# Patient Record
Sex: FEMALE | Race: BLACK OR AFRICAN AMERICAN | NOT HISPANIC OR LATINO
[De-identification: names, ages, dates, MRNs, and addresses within clinical notes are randomized per-mention and may not be internally consistent; named-entity substitution may affect disease eponyms.]

---

## 2019-10-25 ENCOUNTER — APPOINTMENT (OUTPATIENT)
Dept: ANTEPARTUM | Facility: CLINIC | Age: 29
End: 2019-10-25
Payer: COMMERCIAL

## 2019-10-25 PROCEDURE — 76811 OB US DETAILED SNGL FETUS: CPT

## 2019-10-25 PROCEDURE — 76817 TRANSVAGINAL US OBSTETRIC: CPT | Mod: 59

## 2019-11-26 ENCOUNTER — APPOINTMENT (OUTPATIENT)
Dept: OBGYN | Facility: CLINIC | Age: 29
End: 2019-11-26

## 2019-12-30 ENCOUNTER — APPOINTMENT (OUTPATIENT)
Dept: OBGYN | Facility: CLINIC | Age: 29
End: 2019-12-30
Payer: COMMERCIAL

## 2019-12-30 PROCEDURE — 0502F SUBSEQUENT PRENATAL CARE: CPT

## 2020-01-18 ENCOUNTER — APPOINTMENT (OUTPATIENT)
Dept: ANTEPARTUM | Facility: CLINIC | Age: 30
End: 2020-01-18
Payer: COMMERCIAL

## 2020-01-18 PROCEDURE — 76816 OB US FOLLOW-UP PER FETUS: CPT

## 2020-01-18 PROCEDURE — 76819 FETAL BIOPHYS PROFIL W/O NST: CPT

## 2020-02-03 ENCOUNTER — APPOINTMENT (OUTPATIENT)
Dept: OBGYN | Facility: CLINIC | Age: 30
End: 2020-02-03

## 2020-02-21 ENCOUNTER — APPOINTMENT (OUTPATIENT)
Dept: OBGYN | Facility: CLINIC | Age: 30
End: 2020-02-21

## 2020-02-28 ENCOUNTER — APPOINTMENT (OUTPATIENT)
Dept: ANTEPARTUM | Facility: CLINIC | Age: 30
End: 2020-02-28
Payer: COMMERCIAL

## 2020-02-28 PROCEDURE — 76816 OB US FOLLOW-UP PER FETUS: CPT

## 2020-02-28 PROCEDURE — 76819 FETAL BIOPHYS PROFIL W/O NST: CPT

## 2020-03-07 ENCOUNTER — APPOINTMENT (OUTPATIENT)
Dept: OBGYN | Facility: CLINIC | Age: 30
End: 2020-03-07
Payer: COMMERCIAL

## 2020-03-07 PROCEDURE — 0502F SUBSEQUENT PRENATAL CARE: CPT

## 2020-03-09 ENCOUNTER — APPOINTMENT (OUTPATIENT)
Dept: OBGYN | Facility: CLINIC | Age: 30
End: 2020-03-09

## 2020-03-12 ENCOUNTER — APPOINTMENT (OUTPATIENT)
Dept: ANTEPARTUM | Facility: CLINIC | Age: 30
End: 2020-03-12

## 2020-03-12 ENCOUNTER — INPATIENT (INPATIENT)
Facility: HOSPITAL | Age: 30
LOS: 3 days | Discharge: HOME | End: 2020-03-16
Attending: OBSTETRICS & GYNECOLOGY | Admitting: OBSTETRICS & GYNECOLOGY
Payer: COMMERCIAL

## 2020-03-12 VITALS — DIASTOLIC BLOOD PRESSURE: 71 MMHG | HEART RATE: 81 BPM | SYSTOLIC BLOOD PRESSURE: 154 MMHG

## 2020-03-12 DIAGNOSIS — O13.3 GESTATIONAL [PREGNANCY-INDUCED] HYPERTENSION WITHOUT SIGNIFICANT PROTEINURIA, THIRD TRIMESTER: ICD-10-CM

## 2020-03-12 LAB
ALBUMIN SERPL ELPH-MCNC: 3.3 G/DL — SIGNIFICANT CHANGE UP (ref 3.3–5)
ALP SERPL-CCNC: 407 U/L — HIGH (ref 40–120)
ALT FLD-CCNC: 23 U/L — SIGNIFICANT CHANGE UP (ref 4–33)
ANION GAP SERPL CALC-SCNC: 9 MMO/L — SIGNIFICANT CHANGE UP (ref 7–14)
APPEARANCE UR: CLEAR — SIGNIFICANT CHANGE UP
APTT BLD: 25.5 SEC — LOW (ref 27.5–36.3)
AST SERPL-CCNC: 28 U/L — SIGNIFICANT CHANGE UP (ref 4–32)
BASOPHILS # BLD AUTO: 0.02 K/UL — SIGNIFICANT CHANGE UP (ref 0–0.2)
BASOPHILS NFR BLD AUTO: 0.3 % — SIGNIFICANT CHANGE UP (ref 0–2)
BILIRUB SERPL-MCNC: 0.2 MG/DL — SIGNIFICANT CHANGE UP (ref 0.2–1.2)
BILIRUB UR-MCNC: NEGATIVE — SIGNIFICANT CHANGE UP
BLD GP AB SCN SERPL QL: NEGATIVE — SIGNIFICANT CHANGE UP
BLOOD UR QL VISUAL: NEGATIVE — SIGNIFICANT CHANGE UP
BUN SERPL-MCNC: 6 MG/DL — LOW (ref 7–23)
CALCIUM SERPL-MCNC: 10.1 MG/DL — SIGNIFICANT CHANGE UP (ref 8.4–10.5)
CHLORIDE SERPL-SCNC: 104 MMOL/L — SIGNIFICANT CHANGE UP (ref 98–107)
CO2 SERPL-SCNC: 24 MMOL/L — SIGNIFICANT CHANGE UP (ref 22–31)
COLOR SPEC: SIGNIFICANT CHANGE UP
CREAT SERPL-MCNC: 0.54 MG/DL — SIGNIFICANT CHANGE UP (ref 0.5–1.3)
EOSINOPHIL # BLD AUTO: 0.11 K/UL — SIGNIFICANT CHANGE UP (ref 0–0.5)
EOSINOPHIL NFR BLD AUTO: 1.5 % — SIGNIFICANT CHANGE UP (ref 0–6)
FIBRINOGEN PPP-MCNC: 494 MG/DL — SIGNIFICANT CHANGE UP (ref 300–520)
GLUCOSE SERPL-MCNC: 71 MG/DL — SIGNIFICANT CHANGE UP (ref 70–99)
GLUCOSE UR-MCNC: NEGATIVE — SIGNIFICANT CHANGE UP
HCT VFR BLD CALC: 40.2 % — SIGNIFICANT CHANGE UP (ref 34.5–45)
HGB BLD-MCNC: 12.8 G/DL — SIGNIFICANT CHANGE UP (ref 11.5–15.5)
IMM GRANULOCYTES NFR BLD AUTO: 0.3 % — SIGNIFICANT CHANGE UP (ref 0–1.5)
INR BLD: 0.96 — SIGNIFICANT CHANGE UP (ref 0.88–1.17)
KETONES UR-MCNC: NEGATIVE — SIGNIFICANT CHANGE UP
LDH SERPL L TO P-CCNC: 215 U/L — SIGNIFICANT CHANGE UP (ref 135–225)
LEUKOCYTE ESTERASE UR-ACNC: NEGATIVE — SIGNIFICANT CHANGE UP
LYMPHOCYTES # BLD AUTO: 1.71 K/UL — SIGNIFICANT CHANGE UP (ref 1–3.3)
LYMPHOCYTES # BLD AUTO: 23.4 % — SIGNIFICANT CHANGE UP (ref 13–44)
MCHC RBC-ENTMCNC: 29.5 PG — SIGNIFICANT CHANGE UP (ref 27–34)
MCHC RBC-ENTMCNC: 31.8 % — LOW (ref 32–36)
MCV RBC AUTO: 92.6 FL — SIGNIFICANT CHANGE UP (ref 80–100)
MONOCYTES # BLD AUTO: 0.87 K/UL — SIGNIFICANT CHANGE UP (ref 0–0.9)
MONOCYTES NFR BLD AUTO: 11.9 % — SIGNIFICANT CHANGE UP (ref 2–14)
NEUTROPHILS # BLD AUTO: 4.58 K/UL — SIGNIFICANT CHANGE UP (ref 1.8–7.4)
NEUTROPHILS NFR BLD AUTO: 62.6 % — SIGNIFICANT CHANGE UP (ref 43–77)
NITRITE UR-MCNC: NEGATIVE — SIGNIFICANT CHANGE UP
NRBC # FLD: 0 K/UL — SIGNIFICANT CHANGE UP (ref 0–0)
PH UR: 7.5 — SIGNIFICANT CHANGE UP (ref 5–8)
PLATELET # BLD AUTO: 174 K/UL — SIGNIFICANT CHANGE UP (ref 150–400)
PMV BLD: 10.8 FL — SIGNIFICANT CHANGE UP (ref 7–13)
POTASSIUM SERPL-MCNC: 4 MMOL/L — SIGNIFICANT CHANGE UP (ref 3.5–5.3)
POTASSIUM SERPL-SCNC: 4 MMOL/L — SIGNIFICANT CHANGE UP (ref 3.5–5.3)
PROT SERPL-MCNC: 6.3 G/DL — SIGNIFICANT CHANGE UP (ref 6–8.3)
PROT UR-MCNC: NEGATIVE — SIGNIFICANT CHANGE UP
PROTHROM AB SERPL-ACNC: 10.9 SEC — SIGNIFICANT CHANGE UP (ref 9.8–13.1)
RBC # BLD: 4.34 M/UL — SIGNIFICANT CHANGE UP (ref 3.8–5.2)
RBC # FLD: 13.8 % — SIGNIFICANT CHANGE UP (ref 10.3–14.5)
RH IG SCN BLD-IMP: POSITIVE — SIGNIFICANT CHANGE UP
RH IG SCN BLD-IMP: POSITIVE — SIGNIFICANT CHANGE UP
SODIUM SERPL-SCNC: 137 MMOL/L — SIGNIFICANT CHANGE UP (ref 135–145)
SP GR SPEC: 1.01 — SIGNIFICANT CHANGE UP (ref 1–1.04)
URATE SERPL-MCNC: 4.7 MG/DL — SIGNIFICANT CHANGE UP (ref 2.5–7)
UROBILINOGEN FLD QL: NORMAL — SIGNIFICANT CHANGE UP
WBC # BLD: 7.31 K/UL — SIGNIFICANT CHANGE UP (ref 3.8–10.5)
WBC # FLD AUTO: 7.31 K/UL — SIGNIFICANT CHANGE UP (ref 3.8–10.5)

## 2020-03-12 RX ORDER — AMPICILLIN TRIHYDRATE 250 MG
1 CAPSULE ORAL EVERY 4 HOURS
Refills: 0 | Status: DISCONTINUED | OUTPATIENT
Start: 2020-03-12 | End: 2020-03-14

## 2020-03-12 RX ORDER — AMPICILLIN TRIHYDRATE 250 MG
2 CAPSULE ORAL ONCE
Refills: 0 | Status: COMPLETED | OUTPATIENT
Start: 2020-03-12 | End: 2020-03-12

## 2020-03-12 RX ORDER — SODIUM CHLORIDE 9 MG/ML
1000 INJECTION, SOLUTION INTRAVENOUS
Refills: 0 | Status: DISCONTINUED | OUTPATIENT
Start: 2020-03-12 | End: 2020-03-14

## 2020-03-12 RX ORDER — OXYTOCIN 10 UNIT/ML
333.33 VIAL (ML) INJECTION
Qty: 20 | Refills: 0 | Status: DISCONTINUED | OUTPATIENT
Start: 2020-03-12 | End: 2020-03-14

## 2020-03-12 RX ORDER — CITRIC ACID/SODIUM CITRATE 300-500 MG
15 SOLUTION, ORAL ORAL EVERY 6 HOURS
Refills: 0 | Status: DISCONTINUED | OUTPATIENT
Start: 2020-03-12 | End: 2020-03-14

## 2020-03-12 RX ADMIN — Medication 216 GRAM(S): at 20:45

## 2020-03-12 NOTE — OB PROVIDER H&P - PROBLEM SELECTOR PLAN 1
- Admit to L+D  - routine labs, hellp labs  - amp for gbs ppx   - efm/toco  - anticipate   - consents singed  dw Dr. Fabiano Duffy PGY1

## 2020-03-12 NOTE — MEDICAL STUDENT ADULT H&P (EDUCATION) - NS MD HP STUD ASPLAN PLAN FT
Problem 1: induction for pre-eclampsia.   Plan: start IV cytotek     Problem 2: GBS+  Plan: start IV AMP    Problem 3: pain control - wrist, hip  Plan: monitor pain. Consider tylenol/NSAIDs for moderate pain. Consider epidural for severe pain.     Problem 4: hand/finger-cramping. Likely carpal tunnel secondary to pregnancy:  Plan: monitor if condition worsens. Consider BMP to check electrolytes.

## 2020-03-12 NOTE — MEDICAL STUDENT ADULT H&P (EDUCATION) - NS MD HP STUD HX OF PRESENT ILLNESS FT
Pt is a 41 yo  at 40w MAR 3/11/20 LMP 2019 who presents to [facility] per Dr. Guzmán's recommendation to come in for induction due to pre-eclampsia. Pt states that her BP has been elevated for the past week, and reached 142/85 on 3/9 (Monday). Pt denies any history of HTN prior to pregnancy. Pt also reports RUQ pain under her right breast which began a few months ago. This abdominal pain is worsened with wearing a bra, and relieved when lying on her back. The patient also notes that she has been leg/hip discomfort bilaterally, described as a "cramp"/"burning"/"tender" sensation, that has been going on for the 4 months. The patient has been taking tylenol PRN for pain control. The patient also notes that she is having a cramping sensation in her fingers. Pt reports that her pain is currently controlled. Pt denies f/c/n/v/cp/p/SOB. Pt denies recent hx of international travel. Pt is a 39 yo  at 40w MAR 3/11/20 LMP 2019 who presents to [facility] per her Ob (Dr. Guzmán) recommendation to come in for induction due to pre-eclampsia. Pt states that her BP has been elevated for the past week, and reached 142/85 on 3/9 (Monday). Pt denies any history of HTN prior to pregnancy. Pt also reports RUQ pain under her right breast which began a few months ago. This abdominal pain is worsened with wearing a bra, and relieved when lying on her back. The patient also notes that she has been leg/hip discomfort bilaterally, described as a "cramp"/"burning"/"tender" sensation, that has been going on for the 4 months. The patient has been taking tylenol PRN for pain control. The patient also notes that she is having a cramping sensation in her fingers. Pt reports that her pain is currently controlled. Pt denies f/c/n/v/cp/p/SOB. Pt denies recent hx of international travel.

## 2020-03-12 NOTE — MEDICAL STUDENT ADULT H&P (EDUCATION) - NS MD HP STUD PE VITALS FT
Vital Signs Last 24 Hrs  T(C): 36.8 (12 Mar 2020 18:42), Max: 37.0 (12 Mar 2020 18:37)  T(F): 98.2 (12 Mar 2020 18:42), Max: 98.6 (12 Mar 2020 18:37)  HR: 67 (12 Mar 2020 19:59) (67 - 81)  BP: 135/77 (12 Mar 2020 19:57) (132/72 - 154/71)  BP(mean): --  RR: 16 (12 Mar 2020 18:42) (16 - 16)  SpO2: 94% (12 Mar 2020 20:01) (94% - 100%)

## 2020-03-12 NOTE — MEDICAL STUDENT ADULT H&P (EDUCATION) - NS MD HP STUD PMH FT
Ob Hx: Baby was last measured at 7.5 lbs. Pt is GBS+ and will be placed on IV AMP.   Gyn Hx: denies hx of fibroids, endometriosis, any abnormal pap smear results, or past hx or STI. Denies abnormal bleeding or discharge, dysuria, constipation, or diarrhea.   PMH/PSH: prior hx of asthma when ~9yo (not active); hx intrauterine polyps, surgically removed when pt in teens, pt denies complications.  Allergies: shellfish, passionfruit     Medication: tylenol PRN  Fam Hx: non-contributory. No hx of coagulation disorders.    Social Hx: non-smoker, non-drinker, no illicit substances. Ob Hx: Pt has not been feeling contractions. Baby was last measured at 7.5 lbs. Pt is GBS+ and will be placed on IV AMP.   Gyn Hx: denies hx of fibroids, endometriosis, any abnormal pap smear results, or past hx or STI. Denies abnormal bleeding or discharge, dysuria, constipation, or diarrhea.   PMH/PSH: prior hx of asthma when ~9yo (not active); hx of intrauterine polyps, which were surgically removed when pt in teens, pt denies surgical complications.  Allergies: shellfish, passionfruit     Medication: tylenol PRN  Fam Hx: non-contributory. No hx of coagulation disorders.    Social Hx: non-smoker, non-drinker, no illicit substances.

## 2020-03-12 NOTE — OB PROVIDER H&P - HISTORY OF PRESENT ILLNESS
29yo  at 40+1 presenting for IOl for gHTN. Denies s/sx of severe PEC. Denies ctx, vb, lof. +FM.     GBS +  EFW 3600  Sono Vtx.     ObHx: PO Gyn Hx: denies hx of fibroids, endometriosis, any abnormal pap smear results, or past hx or STI. Denies abnormal bleeding or discharge, dysuria, constipation, or diarrhea.  PMH/PSH: prior hx of asthma when ~9yo (not active); hx of intrauterine polyps, which were surgically removed when pt in teens, pt denies surgical complications. Allergies: shellfish, passion fruit    Medication: tylenol PRN, PNV Fam Hx: non-contributory. No hx of coagulation disorders.   Social Hx: non-smoker, non-drinker, no illicit substances.

## 2020-03-13 LAB
ALBUMIN SERPL ELPH-MCNC: 3.1 G/DL — LOW (ref 3.3–5)
ALP SERPL-CCNC: 428 U/L — HIGH (ref 40–120)
ALT FLD-CCNC: 22 U/L — SIGNIFICANT CHANGE UP (ref 4–33)
ANION GAP SERPL CALC-SCNC: 11 MMO/L — SIGNIFICANT CHANGE UP (ref 7–14)
APPEARANCE UR: CLEAR — SIGNIFICANT CHANGE UP
APTT BLD: 26.8 SEC — LOW (ref 27.5–36.3)
AST SERPL-CCNC: 27 U/L — SIGNIFICANT CHANGE UP (ref 4–32)
BASOPHILS # BLD AUTO: 0.02 K/UL — SIGNIFICANT CHANGE UP (ref 0–0.2)
BASOPHILS NFR BLD AUTO: 0.3 % — SIGNIFICANT CHANGE UP (ref 0–2)
BILIRUB SERPL-MCNC: 0.3 MG/DL — SIGNIFICANT CHANGE UP (ref 0.2–1.2)
BILIRUB UR-MCNC: NEGATIVE — SIGNIFICANT CHANGE UP
BLOOD UR QL VISUAL: NEGATIVE — SIGNIFICANT CHANGE UP
BUN SERPL-MCNC: 4 MG/DL — LOW (ref 7–23)
CALCIUM SERPL-MCNC: 9.7 MG/DL — SIGNIFICANT CHANGE UP (ref 8.4–10.5)
CHLORIDE SERPL-SCNC: 104 MMOL/L — SIGNIFICANT CHANGE UP (ref 98–107)
CO2 SERPL-SCNC: 19 MMOL/L — LOW (ref 22–31)
COLOR SPEC: SIGNIFICANT CHANGE UP
CREAT ?TM UR-MCNC: 40.3 MG/DL — SIGNIFICANT CHANGE UP
CREAT SERPL-MCNC: 0.56 MG/DL — SIGNIFICANT CHANGE UP (ref 0.5–1.3)
EOSINOPHIL # BLD AUTO: 0.08 K/UL — SIGNIFICANT CHANGE UP (ref 0–0.5)
EOSINOPHIL NFR BLD AUTO: 1.1 % — SIGNIFICANT CHANGE UP (ref 0–6)
FIBRINOGEN PPP-MCNC: 473 MG/DL — SIGNIFICANT CHANGE UP (ref 300–520)
GLUCOSE SERPL-MCNC: 66 MG/DL — LOW (ref 70–99)
GLUCOSE UR-MCNC: NEGATIVE — SIGNIFICANT CHANGE UP
HCT VFR BLD CALC: 38 % — SIGNIFICANT CHANGE UP (ref 34.5–45)
HGB BLD-MCNC: 12.5 G/DL — SIGNIFICANT CHANGE UP (ref 11.5–15.5)
IMM GRANULOCYTES NFR BLD AUTO: 0.4 % — SIGNIFICANT CHANGE UP (ref 0–1.5)
INR BLD: 0.92 — SIGNIFICANT CHANGE UP (ref 0.88–1.17)
KETONES UR-MCNC: NEGATIVE — SIGNIFICANT CHANGE UP
LDH SERPL L TO P-CCNC: 185 U/L — SIGNIFICANT CHANGE UP (ref 135–225)
LEUKOCYTE ESTERASE UR-ACNC: NEGATIVE — SIGNIFICANT CHANGE UP
LYMPHOCYTES # BLD AUTO: 1.59 K/UL — SIGNIFICANT CHANGE UP (ref 1–3.3)
LYMPHOCYTES # BLD AUTO: 22.1 % — SIGNIFICANT CHANGE UP (ref 13–44)
MCHC RBC-ENTMCNC: 29.6 PG — SIGNIFICANT CHANGE UP (ref 27–34)
MCHC RBC-ENTMCNC: 32.9 % — SIGNIFICANT CHANGE UP (ref 32–36)
MCV RBC AUTO: 89.8 FL — SIGNIFICANT CHANGE UP (ref 80–100)
MONOCYTES # BLD AUTO: 0.79 K/UL — SIGNIFICANT CHANGE UP (ref 0–0.9)
MONOCYTES NFR BLD AUTO: 11 % — SIGNIFICANT CHANGE UP (ref 2–14)
NEUTROPHILS # BLD AUTO: 4.68 K/UL — SIGNIFICANT CHANGE UP (ref 1.8–7.4)
NEUTROPHILS NFR BLD AUTO: 65.1 % — SIGNIFICANT CHANGE UP (ref 43–77)
NITRITE UR-MCNC: NEGATIVE — SIGNIFICANT CHANGE UP
NRBC # FLD: 0 K/UL — SIGNIFICANT CHANGE UP (ref 0–0)
PH UR: 7.5 — SIGNIFICANT CHANGE UP (ref 5–8)
PLATELET # BLD AUTO: 179 K/UL — SIGNIFICANT CHANGE UP (ref 150–400)
PMV BLD: 10.8 FL — SIGNIFICANT CHANGE UP (ref 7–13)
POTASSIUM SERPL-MCNC: 4 MMOL/L — SIGNIFICANT CHANGE UP (ref 3.5–5.3)
POTASSIUM SERPL-SCNC: 4 MMOL/L — SIGNIFICANT CHANGE UP (ref 3.5–5.3)
PROT SERPL-MCNC: 6.1 G/DL — SIGNIFICANT CHANGE UP (ref 6–8.3)
PROT UR-MCNC: 5.6 MG/DL — SIGNIFICANT CHANGE UP
PROT UR-MCNC: 5.9 MG/DL — SIGNIFICANT CHANGE UP
PROT UR-MCNC: NEGATIVE — SIGNIFICANT CHANGE UP
PROTHROM AB SERPL-ACNC: 10.6 SEC — SIGNIFICANT CHANGE UP (ref 9.8–13.1)
RBC # BLD: 4.23 M/UL — SIGNIFICANT CHANGE UP (ref 3.8–5.2)
RBC # FLD: 13.9 % — SIGNIFICANT CHANGE UP (ref 10.3–14.5)
SODIUM SERPL-SCNC: 134 MMOL/L — LOW (ref 135–145)
SP GR SPEC: 1.01 — SIGNIFICANT CHANGE UP (ref 1–1.04)
URATE SERPL-MCNC: 4.9 MG/DL — SIGNIFICANT CHANGE UP (ref 2.5–7)
UROBILINOGEN FLD QL: NORMAL — SIGNIFICANT CHANGE UP
WBC # BLD: 7.19 K/UL — SIGNIFICANT CHANGE UP (ref 3.8–10.5)
WBC # FLD AUTO: 7.19 K/UL — SIGNIFICANT CHANGE UP (ref 3.8–10.5)

## 2020-03-13 RX ORDER — BUTORPHANOL TARTRATE 2 MG/ML
2 INJECTION, SOLUTION INTRAMUSCULAR; INTRAVENOUS ONCE
Refills: 0 | Status: DISCONTINUED | OUTPATIENT
Start: 2020-03-13 | End: 2020-03-14

## 2020-03-13 RX ORDER — MAGNESIUM SULFATE 500 MG/ML
4 VIAL (ML) INJECTION ONCE
Refills: 0 | Status: COMPLETED | OUTPATIENT
Start: 2020-03-13 | End: 2020-03-13

## 2020-03-13 RX ORDER — MAGNESIUM SULFATE 500 MG/ML
2 VIAL (ML) INJECTION
Qty: 40 | Refills: 0 | Status: DISCONTINUED | OUTPATIENT
Start: 2020-03-13 | End: 2020-03-14

## 2020-03-13 RX ORDER — HYDRALAZINE HCL 50 MG
5 TABLET ORAL ONCE
Refills: 0 | Status: COMPLETED | OUTPATIENT
Start: 2020-03-13 | End: 2020-03-13

## 2020-03-13 RX ADMIN — Medication 200 GRAM(S): at 17:09

## 2020-03-13 RX ADMIN — Medication 50 GM/HR: at 17:34

## 2020-03-13 RX ADMIN — Medication 108 GRAM(S): at 04:49

## 2020-03-13 RX ADMIN — Medication 50 GM/HR: at 19:20

## 2020-03-13 RX ADMIN — Medication 5 MILLIGRAM(S): at 17:01

## 2020-03-13 RX ADMIN — Medication 108 GRAM(S): at 00:48

## 2020-03-13 RX ADMIN — Medication 108 GRAM(S): at 21:03

## 2020-03-13 RX ADMIN — Medication 108 GRAM(S): at 12:40

## 2020-03-13 RX ADMIN — Medication 108 GRAM(S): at 09:11

## 2020-03-13 RX ADMIN — Medication 108 GRAM(S): at 17:36

## 2020-03-13 NOTE — CHART NOTE - NSCHARTNOTEFT_GEN_A_CORE
Pt evaluated at bedside for SVE. SROM and feeling more uncomfortable.    VS  T(C): 36.7 (03-13-20 @ 04:37)  HR: 83 (03-13-20 @ 19:04)  BP: 145/88 (03-13-20 @ 19:04)  RR: --  SpO2: 99% (03-13-20 @ 11:23)    SVE: 1.5/80/-3  FHT: 140 bpm, mod cl, +acel, -decel  [place]: intermittent    Plan  sPEC/Mg  GBS+/amp  FHT Cat 1 reassuring  Declines analgesia at this time    ERIN Bliss PGy1

## 2020-03-13 NOTE — CHART NOTE - NSCHARTNOTEFT_GEN_A_CORE
R1 OB Labor Note    S: Patient seen this AM, IOL 2/2 gHTN. No complaints.     Vital Signs Last 24 Hrs  T(C): 36.7 (13 Mar 2020 04:37), Max: 37.0 (12 Mar 2020 18:37)  T(F): 98 (13 Mar 2020 04:37), Max: 98.6 (12 Mar 2020 18:37)  HR: 86 (13 Mar 2020 10:25) (61 - 98)  BP: 142/79 (13 Mar 2020 09:36) (106/58 - 154/71)  BP(mean): --  RR: 16 (12 Mar 2020 18:42) (16 - 16)  SpO2: 100% (13 Mar 2020 10:22) (88% - 100%)    FHT: 135, mod cl, +accels, no decels  [place]: irregular  SVE: deferred      A/P:   - Labor: IOL 2/2 gHTN. Received 2nd dose of 40 mg PO cytotec at 10am.   - Fetus: Cat I tracing  - GBS: positive, getting ampicillin for ppx  - Pain: well controlled.       ADomney PGY-1  d/w Dr. Mario R1 OB Labor Note    S: Patient seen this AM, IOL 2/2 gHTN. No complaints.     Vital Signs Last 24 Hrs  T(C): 36.7 (13 Mar 2020 04:37), Max: 37.0 (12 Mar 2020 18:37)  T(F): 98 (13 Mar 2020 04:37), Max: 98.6 (12 Mar 2020 18:37)  HR: 86 (13 Mar 2020 10:25) (61 - 98)  BP: 142/79 (13 Mar 2020 09:36) (106/58 - 154/71)  BP(mean): --  RR: 16 (12 Mar 2020 18:42) (16 - 16)  SpO2: 100% (13 Mar 2020 10:22) (88% - 100%)    FHT: 135, mod cl, +accels, no decels  [place]: irregular  SVE: deferred      A/P:   - Labor: IOL 2/2 gHTN. Received 2nd dose of 40 mg PO cytotec at 10am.   - Fetus: Cat I tracing  - GBS: positive, getting ampicillin for ppx  - Pain: well controlled.       ADomney PGY-1  d/w Dr. Mario    Pt seen - tracing reviewed  Agree with above

## 2020-03-13 NOTE — CHART NOTE - NSCHARTNOTEFT_GEN_A_CORE
R1 OB Labor Note    S: Patient seen and examined at bedside for severe range blood pressure x 2. 167/96 -> 169/85 15 minutes apart. Patient with h/o gHTN, asthma, HR 60s - RN instructed to push Hydral 5 IV. Patient asymptomatic. Denies HA, SOB, CP, RUQ/epigastric pain, b/l swelling UE and LE swelling, or vision changes.     Vital Signs Last 24 Hrs  T(C): 36.7 (13 Mar 2020 04:37), Max: 37.0 (12 Mar 2020 18:37)  T(F): 98 (13 Mar 2020 04:37), Max: 98.6 (12 Mar 2020 18:37)  HR: 63 (13 Mar 2020 16:50) (58 - 98)  BP: 163/85 (13 Mar 2020 16:50) (106/58 - 167/83)  BP(mean): --  RR: 16 (12 Mar 2020 18:42) (16 - 16)  SpO2: 99% (13 Mar 2020 11:23) (88% - 100%)    PE:   Gen aaox3, nad  CV RRR, no m/r/g  Lungs CTAB, normal WOB, no rales/rhonci/wheezes  Abd soft, gravid uterus  Ext nonedematous    FHT: 145, mod cl, +accels, no decels  [place]:q2m  SVE: deferred    A/P:   - Hydral 5 IVP  - Magneisum started  - HELLP labs drawn  - f/u BP in 20 minutes (5:22 pm)    ADomney PGY-1  to be d/w Dr. Mario

## 2020-03-13 NOTE — CHART NOTE - NSCHARTNOTEFT_GEN_A_CORE
Delayed entry 2/2 clinical duties  R1 Note 03-14-20 @ 00:38    Pt evaluated for progression  Requesting pain medication    VITALS:  T(C): 36.8 (03-13-20 @ 23:59), Max: 36.8 (03-13-20 @ 21:07)  HR: 79 (03-14-20 @ 00:33) (58 - 96)  BP: 124/60 (03-14-20 @ 00:29) (118/69 - 167/83)  RR: --  SpO2: 94% (03-14-20 @ 00:33) (88% - 100%)    EFM:  mod cl +accels -decels  [place]: Ctx Q2-4min  VE: 2/90/-3    IMPRESSION:   FHR Category: 1  Additional:    PLAN:  Stadol x1  Cont PO    d/w Dr. Jonathan Duffy PGY1

## 2020-03-13 NOTE — CHART NOTE - NSCHARTNOTEFT_GEN_A_CORE
R1 OB Tracing Note    T(C): 36.7 (03-13-20 @ 00:45), Max: 37.0 (03-12-20 @ 18:37)  HR: 69 (03-13-20 @ 02:00) (67 - 98)  BP: 118/69 (03-13-20 @ 01:36) (106/58 - 154/71)  RR: 16 (03-12-20 @ 18:42) (16 - 16)  SpO2: 98% (03-13-20 @ 02:00) (92% - 100%)    EFM: 135 bpm, mod cl, +accels, -decels  [place]: cx irregular on toco    A/P 30y P0 admitted for gHTN IOL  -Cont PO  -Cat 1 tracing    Marty PGY1

## 2020-03-14 ENCOUNTER — TRANSCRIPTION ENCOUNTER (OUTPATIENT)
Age: 30
End: 2020-03-14

## 2020-03-14 LAB
ALBUMIN SERPL ELPH-MCNC: 3.1 G/DL — LOW (ref 3.3–5)
ALBUMIN SERPL ELPH-MCNC: 3.3 G/DL — SIGNIFICANT CHANGE UP (ref 3.3–5)
ALP SERPL-CCNC: 460 U/L — HIGH (ref 40–120)
ALP SERPL-CCNC: 460 U/L — HIGH (ref 40–120)
ALT FLD-CCNC: 22 U/L — SIGNIFICANT CHANGE UP (ref 4–33)
ALT FLD-CCNC: 23 U/L — SIGNIFICANT CHANGE UP (ref 4–33)
ANION GAP SERPL CALC-SCNC: 11 MMO/L — SIGNIFICANT CHANGE UP (ref 7–14)
ANION GAP SERPL CALC-SCNC: 12 MMO/L — SIGNIFICANT CHANGE UP (ref 7–14)
APTT BLD: 25.1 SEC — LOW (ref 27.5–36.3)
APTT BLD: 30.1 SEC — SIGNIFICANT CHANGE UP (ref 27.5–36.3)
AST SERPL-CCNC: 24 U/L — SIGNIFICANT CHANGE UP (ref 4–32)
AST SERPL-CCNC: 25 U/L — SIGNIFICANT CHANGE UP (ref 4–32)
BASOPHILS # BLD AUTO: 0.01 K/UL — SIGNIFICANT CHANGE UP (ref 0–0.2)
BASOPHILS # BLD AUTO: 0.02 K/UL — SIGNIFICANT CHANGE UP (ref 0–0.2)
BASOPHILS NFR BLD AUTO: 0.1 % — SIGNIFICANT CHANGE UP (ref 0–2)
BASOPHILS NFR BLD AUTO: 0.2 % — SIGNIFICANT CHANGE UP (ref 0–2)
BILIRUB SERPL-MCNC: 0.3 MG/DL — SIGNIFICANT CHANGE UP (ref 0.2–1.2)
BILIRUB SERPL-MCNC: 0.3 MG/DL — SIGNIFICANT CHANGE UP (ref 0.2–1.2)
BUN SERPL-MCNC: 4 MG/DL — LOW (ref 7–23)
BUN SERPL-MCNC: 5 MG/DL — LOW (ref 7–23)
CALCIUM SERPL-MCNC: 9 MG/DL — SIGNIFICANT CHANGE UP (ref 8.4–10.5)
CALCIUM SERPL-MCNC: 9.1 MG/DL — SIGNIFICANT CHANGE UP (ref 8.4–10.5)
CHLORIDE SERPL-SCNC: 102 MMOL/L — SIGNIFICANT CHANGE UP (ref 98–107)
CHLORIDE SERPL-SCNC: 103 MMOL/L — SIGNIFICANT CHANGE UP (ref 98–107)
CO2 SERPL-SCNC: 19 MMOL/L — LOW (ref 22–31)
CO2 SERPL-SCNC: 19 MMOL/L — LOW (ref 22–31)
CREAT SERPL-MCNC: 0.51 MG/DL — SIGNIFICANT CHANGE UP (ref 0.5–1.3)
CREAT SERPL-MCNC: 0.56 MG/DL — SIGNIFICANT CHANGE UP (ref 0.5–1.3)
EOSINOPHIL # BLD AUTO: 0.01 K/UL — SIGNIFICANT CHANGE UP (ref 0–0.5)
EOSINOPHIL # BLD AUTO: 0.05 K/UL — SIGNIFICANT CHANGE UP (ref 0–0.5)
EOSINOPHIL NFR BLD AUTO: 0.1 % — SIGNIFICANT CHANGE UP (ref 0–6)
EOSINOPHIL NFR BLD AUTO: 0.5 % — SIGNIFICANT CHANGE UP (ref 0–6)
FIBRINOGEN PPP-MCNC: 442 MG/DL — SIGNIFICANT CHANGE UP (ref 300–520)
FIBRINOGEN PPP-MCNC: 562 MG/DL — HIGH (ref 300–520)
GLUCOSE SERPL-MCNC: 79 MG/DL — SIGNIFICANT CHANGE UP (ref 70–99)
GLUCOSE SERPL-MCNC: 90 MG/DL — SIGNIFICANT CHANGE UP (ref 70–99)
HCT VFR BLD CALC: 38.9 % — SIGNIFICANT CHANGE UP (ref 34.5–45)
HCT VFR BLD CALC: 39.1 % — SIGNIFICANT CHANGE UP (ref 34.5–45)
HGB BLD-MCNC: 12.7 G/DL — SIGNIFICANT CHANGE UP (ref 11.5–15.5)
HGB BLD-MCNC: 12.8 G/DL — SIGNIFICANT CHANGE UP (ref 11.5–15.5)
IMM GRANULOCYTES NFR BLD AUTO: 0.2 % — SIGNIFICANT CHANGE UP (ref 0–1.5)
IMM GRANULOCYTES NFR BLD AUTO: 0.3 % — SIGNIFICANT CHANGE UP (ref 0–1.5)
INR BLD: 0.89 — SIGNIFICANT CHANGE UP (ref 0.88–1.17)
INR BLD: 0.93 — SIGNIFICANT CHANGE UP (ref 0.88–1.17)
LDH SERPL L TO P-CCNC: 194 U/L — SIGNIFICANT CHANGE UP (ref 135–225)
LDH SERPL L TO P-CCNC: 201 U/L — SIGNIFICANT CHANGE UP (ref 135–225)
LYMPHOCYTES # BLD AUTO: 1.05 K/UL — SIGNIFICANT CHANGE UP (ref 1–3.3)
LYMPHOCYTES # BLD AUTO: 1.38 K/UL — SIGNIFICANT CHANGE UP (ref 1–3.3)
LYMPHOCYTES # BLD AUTO: 10 % — LOW (ref 13–44)
LYMPHOCYTES # BLD AUTO: 14.8 % — SIGNIFICANT CHANGE UP (ref 13–44)
MAGNESIUM SERPL-MCNC: 3.9 MG/DL — HIGH (ref 1.6–2.6)
MAGNESIUM SERPL-MCNC: 4.6 MG/DL — HIGH (ref 1.6–2.6)
MAGNESIUM SERPL-MCNC: 5.2 MG/DL — HIGH (ref 1.6–2.6)
MAGNESIUM SERPL-MCNC: 6 MG/DL — HIGH (ref 1.6–2.6)
MCHC RBC-ENTMCNC: 29.3 PG — SIGNIFICANT CHANGE UP (ref 27–34)
MCHC RBC-ENTMCNC: 29.4 PG — SIGNIFICANT CHANGE UP (ref 27–34)
MCHC RBC-ENTMCNC: 32.5 % — SIGNIFICANT CHANGE UP (ref 32–36)
MCHC RBC-ENTMCNC: 32.9 % — SIGNIFICANT CHANGE UP (ref 32–36)
MCV RBC AUTO: 89.2 FL — SIGNIFICANT CHANGE UP (ref 80–100)
MCV RBC AUTO: 90.1 FL — SIGNIFICANT CHANGE UP (ref 80–100)
MONOCYTES # BLD AUTO: 0.8 K/UL — SIGNIFICANT CHANGE UP (ref 0–0.9)
MONOCYTES # BLD AUTO: 0.92 K/UL — HIGH (ref 0–0.9)
MONOCYTES NFR BLD AUTO: 8.6 % — SIGNIFICANT CHANGE UP (ref 2–14)
MONOCYTES NFR BLD AUTO: 8.8 % — SIGNIFICANT CHANGE UP (ref 2–14)
NEUTROPHILS # BLD AUTO: 7.05 K/UL — SIGNIFICANT CHANGE UP (ref 1.8–7.4)
NEUTROPHILS # BLD AUTO: 8.46 K/UL — HIGH (ref 1.8–7.4)
NEUTROPHILS NFR BLD AUTO: 75.7 % — SIGNIFICANT CHANGE UP (ref 43–77)
NEUTROPHILS NFR BLD AUTO: 80.7 % — HIGH (ref 43–77)
NRBC # FLD: 0 K/UL — SIGNIFICANT CHANGE UP (ref 0–0)
NRBC # FLD: 0 K/UL — SIGNIFICANT CHANGE UP (ref 0–0)
PLATELET # BLD AUTO: 167 K/UL — SIGNIFICANT CHANGE UP (ref 150–400)
PLATELET # BLD AUTO: 172 K/UL — SIGNIFICANT CHANGE UP (ref 150–400)
PMV BLD: 10.3 FL — SIGNIFICANT CHANGE UP (ref 7–13)
PMV BLD: 10.3 FL — SIGNIFICANT CHANGE UP (ref 7–13)
POTASSIUM SERPL-MCNC: 3.6 MMOL/L — SIGNIFICANT CHANGE UP (ref 3.5–5.3)
POTASSIUM SERPL-MCNC: 3.9 MMOL/L — SIGNIFICANT CHANGE UP (ref 3.5–5.3)
POTASSIUM SERPL-SCNC: 3.6 MMOL/L — SIGNIFICANT CHANGE UP (ref 3.5–5.3)
POTASSIUM SERPL-SCNC: 3.9 MMOL/L — SIGNIFICANT CHANGE UP (ref 3.5–5.3)
PROT SERPL-MCNC: 6.1 G/DL — SIGNIFICANT CHANGE UP (ref 6–8.3)
PROT SERPL-MCNC: 6.1 G/DL — SIGNIFICANT CHANGE UP (ref 6–8.3)
PROTHROM AB SERPL-ACNC: 10.2 SEC — SIGNIFICANT CHANGE UP (ref 9.8–13.1)
PROTHROM AB SERPL-ACNC: 10.6 SEC — SIGNIFICANT CHANGE UP (ref 9.8–13.1)
RBC # BLD: 4.34 M/UL — SIGNIFICANT CHANGE UP (ref 3.8–5.2)
RBC # BLD: 4.36 M/UL — SIGNIFICANT CHANGE UP (ref 3.8–5.2)
RBC # FLD: 13.9 % — SIGNIFICANT CHANGE UP (ref 10.3–14.5)
RBC # FLD: 14 % — SIGNIFICANT CHANGE UP (ref 10.3–14.5)
SODIUM SERPL-SCNC: 132 MMOL/L — LOW (ref 135–145)
SODIUM SERPL-SCNC: 134 MMOL/L — LOW (ref 135–145)
T PALLIDUM AB TITR SER: NEGATIVE — SIGNIFICANT CHANGE UP
URATE SERPL-MCNC: 4.8 MG/DL — SIGNIFICANT CHANGE UP (ref 2.5–7)
URATE SERPL-MCNC: 5.3 MG/DL — SIGNIFICANT CHANGE UP (ref 2.5–7)
WBC # BLD: 10.48 K/UL — SIGNIFICANT CHANGE UP (ref 3.8–10.5)
WBC # BLD: 9.32 K/UL — SIGNIFICANT CHANGE UP (ref 3.8–10.5)
WBC # FLD AUTO: 10.48 K/UL — SIGNIFICANT CHANGE UP (ref 3.8–10.5)
WBC # FLD AUTO: 9.32 K/UL — SIGNIFICANT CHANGE UP (ref 3.8–10.5)

## 2020-03-14 PROCEDURE — 59400 OBSTETRICAL CARE: CPT

## 2020-03-14 RX ORDER — OXYCODONE HYDROCHLORIDE 5 MG/1
5 TABLET ORAL
Refills: 0 | Status: DISCONTINUED | OUTPATIENT
Start: 2020-03-14 | End: 2020-03-16

## 2020-03-14 RX ORDER — SODIUM CHLORIDE 9 MG/ML
3 INJECTION INTRAMUSCULAR; INTRAVENOUS; SUBCUTANEOUS EVERY 8 HOURS
Refills: 0 | Status: DISCONTINUED | OUTPATIENT
Start: 2020-03-14 | End: 2020-03-16

## 2020-03-14 RX ORDER — HYDROCORTISONE 1 %
1 OINTMENT (GRAM) TOPICAL EVERY 6 HOURS
Refills: 0 | Status: DISCONTINUED | OUTPATIENT
Start: 2020-03-14 | End: 2020-03-16

## 2020-03-14 RX ORDER — KETOROLAC TROMETHAMINE 30 MG/ML
30 SYRINGE (ML) INJECTION ONCE
Refills: 0 | Status: DISCONTINUED | OUTPATIENT
Start: 2020-03-14 | End: 2020-03-15

## 2020-03-14 RX ORDER — LABETALOL HCL 100 MG
20 TABLET ORAL ONCE
Refills: 0 | Status: COMPLETED | OUTPATIENT
Start: 2020-03-14 | End: 2020-03-14

## 2020-03-14 RX ORDER — ACETAMINOPHEN 500 MG
975 TABLET ORAL
Refills: 0 | Status: DISCONTINUED | OUTPATIENT
Start: 2020-03-14 | End: 2020-03-16

## 2020-03-14 RX ORDER — OXYTOCIN 10 UNIT/ML
2 VIAL (ML) INJECTION
Qty: 30 | Refills: 0 | Status: DISCONTINUED | OUTPATIENT
Start: 2020-03-14 | End: 2020-03-14

## 2020-03-14 RX ORDER — MAGNESIUM SULFATE 500 MG/ML
2 VIAL (ML) INJECTION
Qty: 40 | Refills: 0 | Status: DISCONTINUED | OUTPATIENT
Start: 2020-03-14 | End: 2020-03-14

## 2020-03-14 RX ORDER — MAGNESIUM HYDROXIDE 400 MG/1
30 TABLET, CHEWABLE ORAL
Refills: 0 | Status: DISCONTINUED | OUTPATIENT
Start: 2020-03-14 | End: 2020-03-16

## 2020-03-14 RX ORDER — AER TRAVELER 0.5 G/1
1 SOLUTION RECTAL; TOPICAL EVERY 4 HOURS
Refills: 0 | Status: DISCONTINUED | OUTPATIENT
Start: 2020-03-14 | End: 2020-03-16

## 2020-03-14 RX ORDER — OXYTOCIN 10 UNIT/ML
333.33 VIAL (ML) INJECTION
Qty: 20 | Refills: 0 | Status: DISCONTINUED | OUTPATIENT
Start: 2020-03-14 | End: 2020-03-14

## 2020-03-14 RX ORDER — SODIUM CHLORIDE 9 MG/ML
1000 INJECTION, SOLUTION INTRAVENOUS
Refills: 0 | Status: DISCONTINUED | OUTPATIENT
Start: 2020-03-14 | End: 2020-03-14

## 2020-03-14 RX ORDER — CITRIC ACID/SODIUM CITRATE 300-500 MG
15 SOLUTION, ORAL ORAL EVERY 6 HOURS
Refills: 0 | Status: DISCONTINUED | OUTPATIENT
Start: 2020-03-14 | End: 2020-03-14

## 2020-03-14 RX ORDER — SODIUM CHLORIDE 9 MG/ML
1000 INJECTION, SOLUTION INTRAVENOUS
Refills: 0 | Status: DISCONTINUED | OUTPATIENT
Start: 2020-03-14 | End: 2020-03-15

## 2020-03-14 RX ORDER — GLYCERIN ADULT
1 SUPPOSITORY, RECTAL RECTAL AT BEDTIME
Refills: 0 | Status: DISCONTINUED | OUTPATIENT
Start: 2020-03-14 | End: 2020-03-16

## 2020-03-14 RX ORDER — BENZOCAINE 10 %
1 GEL (GRAM) MUCOUS MEMBRANE EVERY 6 HOURS
Refills: 0 | Status: DISCONTINUED | OUTPATIENT
Start: 2020-03-14 | End: 2020-03-16

## 2020-03-14 RX ORDER — AMPICILLIN TRIHYDRATE 250 MG
1 CAPSULE ORAL EVERY 4 HOURS
Refills: 0 | Status: DISCONTINUED | OUTPATIENT
Start: 2020-03-14 | End: 2020-03-14

## 2020-03-14 RX ORDER — DIBUCAINE 1 %
1 OINTMENT (GRAM) RECTAL EVERY 6 HOURS
Refills: 0 | Status: DISCONTINUED | OUTPATIENT
Start: 2020-03-14 | End: 2020-03-16

## 2020-03-14 RX ORDER — SODIUM CHLORIDE 9 MG/ML
1000 INJECTION INTRAMUSCULAR; INTRAVENOUS; SUBCUTANEOUS
Refills: 0 | Status: DISCONTINUED | OUTPATIENT
Start: 2020-03-14 | End: 2020-03-14

## 2020-03-14 RX ORDER — OXYCODONE HYDROCHLORIDE 5 MG/1
5 TABLET ORAL ONCE
Refills: 0 | Status: DISCONTINUED | OUTPATIENT
Start: 2020-03-14 | End: 2020-03-16

## 2020-03-14 RX ORDER — SIMETHICONE 80 MG/1
80 TABLET, CHEWABLE ORAL EVERY 4 HOURS
Refills: 0 | Status: DISCONTINUED | OUTPATIENT
Start: 2020-03-14 | End: 2020-03-16

## 2020-03-14 RX ORDER — TETANUS TOXOID, REDUCED DIPHTHERIA TOXOID AND ACELLULAR PERTUSSIS VACCINE, ADSORBED 5; 2.5; 8; 8; 2.5 [IU]/.5ML; [IU]/.5ML; UG/.5ML; UG/.5ML; UG/.5ML
0.5 SUSPENSION INTRAMUSCULAR ONCE
Refills: 0 | Status: COMPLETED | OUTPATIENT
Start: 2020-03-14

## 2020-03-14 RX ORDER — SODIUM CHLORIDE 9 MG/ML
500 INJECTION, SOLUTION INTRAVENOUS ONCE
Refills: 0 | Status: COMPLETED | OUTPATIENT
Start: 2020-03-14 | End: 2020-03-14

## 2020-03-14 RX ORDER — LANOLIN
1 OINTMENT (GRAM) TOPICAL EVERY 6 HOURS
Refills: 0 | Status: DISCONTINUED | OUTPATIENT
Start: 2020-03-14 | End: 2020-03-16

## 2020-03-14 RX ORDER — DIPHENHYDRAMINE HCL 50 MG
25 CAPSULE ORAL EVERY 6 HOURS
Refills: 0 | Status: DISCONTINUED | OUTPATIENT
Start: 2020-03-14 | End: 2020-03-16

## 2020-03-14 RX ORDER — PRAMOXINE HYDROCHLORIDE 150 MG/15G
1 AEROSOL, FOAM RECTAL EVERY 4 HOURS
Refills: 0 | Status: DISCONTINUED | OUTPATIENT
Start: 2020-03-14 | End: 2020-03-16

## 2020-03-14 RX ORDER — IBUPROFEN 200 MG
600 TABLET ORAL EVERY 6 HOURS
Refills: 0 | Status: COMPLETED | OUTPATIENT
Start: 2020-03-14 | End: 2021-02-10

## 2020-03-14 RX ADMIN — SODIUM CHLORIDE 50 MILLILITER(S): 9 INJECTION, SOLUTION INTRAVENOUS at 21:00

## 2020-03-14 RX ADMIN — BUTORPHANOL TARTRATE 2 MILLIGRAM(S): 2 INJECTION, SOLUTION INTRAMUSCULAR; INTRAVENOUS at 00:03

## 2020-03-14 RX ADMIN — Medication 20 MILLIGRAM(S): at 03:11

## 2020-03-14 RX ADMIN — Medication 108 GRAM(S): at 14:08

## 2020-03-14 RX ADMIN — SODIUM CHLORIDE 50 MILLILITER(S): 9 INJECTION INTRAMUSCULAR; INTRAVENOUS; SUBCUTANEOUS at 09:26

## 2020-03-14 RX ADMIN — Medication 50 GM/HR: at 21:39

## 2020-03-14 RX ADMIN — Medication 108 GRAM(S): at 05:45

## 2020-03-14 RX ADMIN — Medication 50 GM/HR: at 07:25

## 2020-03-14 RX ADMIN — Medication 108 GRAM(S): at 01:03

## 2020-03-14 RX ADMIN — Medication 50 GM/HR: at 19:21

## 2020-03-14 RX ADMIN — Medication 108 GRAM(S): at 10:00

## 2020-03-14 RX ADMIN — Medication 1000 MILLIUNIT(S)/MIN: at 17:38

## 2020-03-14 RX ADMIN — Medication 2 MILLIUNIT(S)/MIN: at 09:26

## 2020-03-14 RX ADMIN — SODIUM CHLORIDE 500 MILLILITER(S): 9 INJECTION, SOLUTION INTRAVENOUS at 20:00

## 2020-03-14 RX ADMIN — BUTORPHANOL TARTRATE 2 MILLIGRAM(S): 2 INJECTION, SOLUTION INTRAMUSCULAR; INTRAVENOUS at 01:01

## 2020-03-14 NOTE — OB PROVIDER DELIVERY SUMMARY - NSPROVIDERDELIVERYNOTE_OBGYN_ALL_OB_FT
viable male. INfant delivered atraumatically, nose and mouth bulb suctioned, delayed cord clamping.  RML repaired with 2-0 chromic.  Left vaginal laceration repaired with figure of eight sutures.  Placenta delivered spontaneously an dintact.  +atony noted.  Resolved with bimanual massage, pitocin, cytotec, straight cath 400cc.  Good hemostasis noted.

## 2020-03-14 NOTE — CHART NOTE - NSCHARTNOTEFT_GEN_A_CORE
R1 Note 03-14-20 @ 04:42    Pt examined for progression    VITALS:  T(C): 36.5 (03-14-20 @ 02:14), Max: 36.8 (03-13-20 @ 21:07)  HR: 100 (03-14-20 @ 04:38) (58 - 100)  BP: 155/84 (03-14-20 @ 04:31) (119/61 - 176/86)  RR: --  SpO2: 95% (03-14-20 @ 04:38) (88% - 100%)    EFM:  mod cl +accels -decels  [place]: Ctx Q3-5min irregular  VE: 3/80/-3    IMPRESSION:   FHR Category: 1  Additional:    PLAN:  Call for epi  Move to LDR  For VCx1 after PO course, then pit    d/w Dr. Jonathan Duffy PGY1

## 2020-03-14 NOTE — OB PROVIDER IHI INDUCTION/AUGMENTATION NOTE - NS_CHECKALL_OBGYN_ALL_OB
Contractions pattern was reviewed/Induction / Augmentation was discussed/Order was written/H&P was completed/FHR was reviewed
Order was written/H&P was completed/FHR was reviewed/Induction / Augmentation was discussed/Contractions pattern was reviewed

## 2020-03-14 NOTE — PROGRESS NOTE ADULT - SUBJECTIVE AND OBJECTIVE BOX
S:  Pt seen and examined for cervical change     c/o rectal pressure     O:   T(C): 37.4 (09:56)  HR: 97 (11:48)  BP: 140/73 (11:51)  RR: --  SpO2: 98% (11:48)  SVE: anterior lip/0 station          A/P: 30y admitted for IOL for preeclampsia   -Continue current management  -Anticipate   -labor down    MARINO Freitas MD

## 2020-03-14 NOTE — DISCHARGE NOTE OB - CARE PROVIDER_API CALL
Curly Guzmán)  MaternalFetal Medicine; Obstetrics and Gynecology  [address] [place] [address]  Phone: [phone]  Fax: [phone]  Follow Up Time:

## 2020-03-14 NOTE — DISCHARGE NOTE OB - MEDICATION SUMMARY - MEDICATIONS TO TAKE
I will START or STAY ON the medications listed below when I get home from the hospital:    ibuprofen 600 mg oral tablet  -- 1 tab(s) by mouth every 6 hours, As Needed  -- Indication: For Pain    acetaminophen 325 mg oral tablet  -- 3 tab(s) by mouth , As Needed  -- Indication: For Pain    Prenatal Multivitamins with Folic Acid 1 mg oral tablet  -- 1 tab(s) by mouth once a day  -- Indication: For Vitamins

## 2020-03-14 NOTE — OB RN DELIVERY SUMMARY - NS_SEPSISRSKCALC_OBGYN_ALL_OB_FT
EOS calculated successfully. EOS Risk Factor: 0.5/1000 live births ([facility] national incidence); GA=40w3d; Temp=99.32; ROM=21.3; GBS='Positive'; Antibiotics='No antibiotics or any antibiotics < 2 hrs prior to birth'

## 2020-03-14 NOTE — DISCHARGE NOTE OB - CARE PLAN
Principal Discharge DX:	Vaginal delivery  Goal:	recovery  Assessment and plan of treatment:	with Dr. Guzmán  Secondary Diagnosis:	Pre-eclampsia in third trimester

## 2020-03-14 NOTE — CHART NOTE - NSCHARTNOTEFT_GEN_A_CORE
R1 OB Labor Note    S: Patient seen and examined at bedside for cervical change.     Vital Signs Last 24 Hrs  T(C): 36.7 (14 Mar 2020 08:14), Max: 36.8 (13 Mar 2020 21:07)  T(F): 98.06 (14 Mar 2020 08:14), Max: 98.24 (13 Mar 2020 21:07)  HR: 96 (14 Mar 2020 09:21) (58 - 108)  BP: 120/65 (14 Mar 2020 09:07) (115/57 - 176/86)  BP(mean): --  RR: --  SpO2: 99% (14 Mar 2020 09:17) (91% - 100%)    FHT: 125, mod cl, +accels, no decels  [place]: q7-8m  SVE: 4-5/90/-3    A/P:   - Labor: sp PO, 4pit  - Fetus: Cat I tracing  - Pain: epidural in place    ADomney PGY-1  d/w Dr. Freitas

## 2020-03-14 NOTE — DISCHARGE NOTE OB - PATIENT PORTAL LINK FT
You can access the FollowMyHealth Patient Portal offered by [facility] by registering at the following website: [email or web address]. By joining [employer]’s FollowMyHealth portal, you will also be able to view your health information using other applications (apps) compatible with our system.

## 2020-03-14 NOTE — PROVIDER CONTACT NOTE (OTHER) - ASSESSMENT
Pt denies dizziness, lightheadedness, or blurred vision at this time. Pt able to ambulate to bathroom and void 250mL at this time. Pt oral hydrating and eating at this time. Pt asymptomatic at this time.

## 2020-03-14 NOTE — DISCHARGE NOTE OB - MATERIALS PROVIDED
Immunization Record/Shaken Baby Prevention Handout/Breastfeeding Guide and Packet/Tdap Vaccination (VIS Pub Date: 2012)/Discharge Medication Information for Patients and Families Pocket Guide/[facility] Hearing Screen Program/Breastfeeding Log/Back To Sleep Handout/Birth Certificate Instructions/[facility] [place] Screening Program/Vaccinations/Breastfeeding Mother’s Support Group Information/Guide to Postpartum Care

## 2020-03-14 NOTE — OB RN DELIVERY SUMMARY - NS_LABORCHARACTER_OBGYN_ALL_OB
Induction of labor-Medicinal/Antibiotics in labor/Internal electronic FM/Augmentation of labor/External electronic FM

## 2020-03-15 LAB
MAGNESIUM SERPL-MCNC: 4.6 MG/DL — HIGH (ref 1.6–2.6)
MAGNESIUM SERPL-MCNC: 4.8 MG/DL — HIGH (ref 1.6–2.6)
MAGNESIUM SERPL-MCNC: 4.8 MG/DL — HIGH (ref 1.6–2.6)

## 2020-03-15 RX ORDER — MAGNESIUM SULFATE 500 MG/ML
2 VIAL (ML) INJECTION
Qty: 40 | Refills: 0 | Status: DISCONTINUED | OUTPATIENT
Start: 2020-03-15 | End: 2020-03-15

## 2020-03-15 RX ORDER — IBUPROFEN 200 MG
600 TABLET ORAL EVERY 6 HOURS
Refills: 0 | Status: DISCONTINUED | OUTPATIENT
Start: 2020-03-15 | End: 2020-03-16

## 2020-03-15 RX ORDER — TETANUS TOXOID, REDUCED DIPHTHERIA TOXOID AND ACELLULAR PERTUSSIS VACCINE, ADSORBED 5; 2.5; 8; 8; 2.5 [IU]/.5ML; [IU]/.5ML; UG/.5ML; UG/.5ML; UG/.5ML
0.5 SUSPENSION INTRAMUSCULAR ONCE
Refills: 0 | Status: COMPLETED | OUTPATIENT
Start: 2020-03-15 | End: 2020-03-15

## 2020-03-15 RX ADMIN — Medication 50 GM/HR: at 07:18

## 2020-03-15 RX ADMIN — Medication 30 MILLIGRAM(S): at 02:45

## 2020-03-15 RX ADMIN — TETANUS TOXOID, REDUCED DIPHTHERIA TOXOID AND ACELLULAR PERTUSSIS VACCINE, ADSORBED 0.5 MILLILITER(S): 5; 2.5; 8; 8; 2.5 SUSPENSION INTRAMUSCULAR at 23:01

## 2020-03-15 RX ADMIN — Medication 600 MILLIGRAM(S): at 23:45

## 2020-03-15 RX ADMIN — Medication 600 MILLIGRAM(S): at 12:15

## 2020-03-15 RX ADMIN — SODIUM CHLORIDE 3 MILLILITER(S): 9 INJECTION INTRAMUSCULAR; INTRAVENOUS; SUBCUTANEOUS at 13:47

## 2020-03-15 RX ADMIN — Medication 30 MILLIGRAM(S): at 02:18

## 2020-03-15 RX ADMIN — Medication 600 MILLIGRAM(S): at 23:00

## 2020-03-15 RX ADMIN — Medication 600 MILLIGRAM(S): at 17:06

## 2020-03-15 RX ADMIN — SODIUM CHLORIDE 3 MILLILITER(S): 9 INJECTION INTRAMUSCULAR; INTRAVENOUS; SUBCUTANEOUS at 21:14

## 2020-03-15 RX ADMIN — Medication 600 MILLIGRAM(S): at 17:45

## 2020-03-15 RX ADMIN — Medication 600 MILLIGRAM(S): at 11:36

## 2020-03-15 NOTE — PROGRESS NOTE ADULT - SUBJECTIVE AND OBJECTIVE BOX
OB Progress Note:  PPD#1    S: 29yo PPD#1 s/p  s/b sPEC, currently on Mag. Patient feels well. Pain is well controlled. She is tolerating a regular diet and passing flatus. She is voiding spontaneously, and has not yet ambulated. Denies HA/vision changes/epigastric pain/CP/SOB. Denies lightheadedness/dizziness. Denies N/V. Denies heavy vaginal bleeding.    O:  Vitals:  Vital Signs Last 24 Hrs  T(C): 36.8 (15 Mar 2020 04:00), Max: 37.6 (14 Mar 2020 19:21)  T(F): 98.2 (15 Mar 2020 04:00), Max: 99.7 (14 Mar 2020 19:21)  HR: 86 (15 Mar 2020 04:00) (86 - 127)  BP: 130/74 (15 Mar 2020 04:00) (115/57 - 173/87)  BP(mean): --  RR: 18 (15 Mar 2020 04:00) (18 - 20)  SpO2: 99% (15 Mar 2020 04:00) (83% - 100%)    MEDICATIONS  (STANDING):  acetaminophen   Tablet .. 975 milliGRAM(s) Oral <User Schedule>  diphtheria/tetanus/pertussis (acellular) Vaccine (ADAcel) 0.5 milliLiter(s) IntraMuscular once  ibuprofen  Tablet. 600 milliGRAM(s) Oral every 6 hours  lactated ringers. 1000 milliLiter(s) (50 mL/Hr) IV Continuous <Continuous>  magnesium sulfate Infusion 2 Gm/Hr (50 mL/Hr) IV Continuous <Continuous>  prenatal multivitamin 1 Tablet(s) Oral daily  sodium chloride 0.9% lock flush 3 milliLiter(s) IV Push every 8 hours      Labs:  Blood type: A Positive  Rubella IgG: RPR: Negative                          12.7   10.48 >-----------< 172    (  @ 09:50 )             39.1                        12.8   9.32 >-----------< 167    (  @ 23:59 )             38.9                        12.5   7.19 >-----------< 179    (  @ 17:25 )             38.0                        12.8   7.31 >-----------< 174    (  @ 20:15 )             40.2    20 @ 09:50      134<L>  |  103  |  5<L>  ----------------------------<  90  3.9   |  19<L>  |  0.56    20 @ 23:59      132<L>  |  102  |  4<L>  ----------------------------<  79  3.6   |  19<L>  |  0.51    20 @ 17:25      134<L>  |  104  |  4<L>  ----------------------------<  66<L>  4.0   |  19<L>  |  0.56    20 @ 20:15      137  |  104  |  6<L>  ----------------------------<  71  4.0   |  24  |  0.54        Ca    9.0      14 Mar 2020 09:50  Ca    9.1      13 Mar 2020 23:59  Ca    9.7      13 Mar 2020 17:25  Ca    10.1      12 Mar 2020 20:15  Mg     4.8<H>     03-15  Mg     6.0<H>     03-14  Mg     5.2<H>     03-14  Mg     4.6<H>     03-14  Mg     4.6<H>     03-14  Mg     3.9<H>     0313    TPro  6.1  /  Alb  3.1<L>  /  TBili  0.3  /  DBili  x   /  AST  24  /  ALT  22  /  AlkPhos  460<H>  20 @ 09:50  TPro  6.1  /  Alb  3.3  /  TBili  0.3  /  DBili  x   /  AST  25  /  ALT  23  /  AlkPhos  460<H>  20 @ 23:59  TPro  6.1  /  Alb  3.1<L>  /  TBili  0.3  /  DBili  x   /  AST  27  /  ALT  22  /  AlkPhos  428<H>  20 @ 17:25  TPro  6.3  /  Alb  3.3  /  TBili  0.2  /  DBili  x   /  AST  28  /  ALT  23  /  AlkPhos  407<H>  20 @ 20:15          Physical Exam:  General: NAD  Abdomen: soft, non-tender, non-distended, fundus firm  Vaginal: No heavy vaginal bleeding  Extremities: No erythema/edema

## 2020-03-15 NOTE — LACTATION INITIAL EVALUATION - LACTATION INTERVENTIONS
initiate hand expression routine/assisted with deep latch and positioning  discussed  signs  of  effective  feeding and  swallowing.  discussed  compression at  breast when  nbn  stops  drinking  and  is  still sucking.  instructed  to offer both  breast at a feeding ,feed on cue and safe  skin to skin./initiate skin to skin

## 2020-03-15 NOTE — PROVIDER CONTACT NOTE (OTHER) - ACTION/TREATMENT ORDERED:
500mL bolus of LR ordered at this time.
Md Frankel made aware of patient's elevated BP. Vital to be repeated in 15 minutes

## 2020-03-15 NOTE — PROGRESS NOTE ADULT - SUBJECTIVE AND OBJECTIVE BOX
S: Patient doing well. No complaints. Minimal lochia. Pain controlled.    O: Vital Signs Last 24 Hrs  T(C): 36.4 (15 Mar 2020 10:01), Max: 37.6 (14 Mar 2020 19:21)  T(F): 97.6 (15 Mar 2020 10:01), Max: 99.7 (14 Mar 2020 19:21)  HR: 90 (15 Mar 2020 10:) (71 - 127)  BP: 138/77 (15 Mar 2020 10:01) (130/74 - 173/87)  BP(mean): --  RR: 17 (15 Mar 2020 10:01) (17 - 20)  SpO2: 99% (15 Mar 2020 10:01) (83% - 100%)    Gen: NAD  Abd: soft, Nontender, Nondistended, fundus firm  Ext: no tendern, mild edema    Labs:                        12.7   10.48 )-----------( 172      ( 14 Mar 2020 09:50 )             39.1       A: 30y PPD#1 s/p  doing well.    Plan:  Routine postpartum care  Encouraged out of bed  Regular diet

## 2020-03-15 NOTE — PROGRESS NOTE ADULT - ASSESSMENT
A/P: 29yo PPD#1 s/p .  Patient is stable and doing well post-partum. A/P: 31yo PPD#1 s/p  c/b sPEC on Mag.  Patient is stable and doing well post-partum.

## 2020-03-15 NOTE — PROGRESS NOTE ADULT - SUBJECTIVE AND OBJECTIVE BOX
ANESTHESIA POST-EPIDURAL CHECK    30y Female s/p  DAY 1     No COMPLAINTS    NO APPARENT ANESTHESIA COMPLICATIONS

## 2020-03-15 NOTE — LACTATION INITIAL EVALUATION - INTERVENTION OUTCOME
verbalizes understanding/nbn  stuffy  and  sleepy  .  hand  expressed  and  gave  several  drops  of  colostrum  . rn aware  of  visit  and  to  examine  nbn .

## 2020-03-15 NOTE — PROGRESS NOTE ADULT - PROBLEM SELECTOR PLAN 1
- D/c Mg 3/15 @ 430p  - monitor BPs  - Pain well controlled, continue current pain regimen  - Increase ambulation, SCDs when not ambulating  - Continue regular diet    Robyn Frankel PGY-1

## 2020-03-16 VITALS
SYSTOLIC BLOOD PRESSURE: 134 MMHG | HEART RATE: 78 BPM | TEMPERATURE: 98 F | RESPIRATION RATE: 19 BRPM | OXYGEN SATURATION: 99 % | DIASTOLIC BLOOD PRESSURE: 77 MMHG

## 2020-03-16 RX ORDER — ACETAMINOPHEN 500 MG
3 TABLET ORAL
Qty: 0 | Refills: 0 | DISCHARGE
Start: 2020-03-16

## 2020-03-16 RX ORDER — IBUPROFEN 200 MG
1 TABLET ORAL
Qty: 0 | Refills: 0 | DISCHARGE
Start: 2020-03-16

## 2020-03-16 RX ADMIN — Medication 1 TABLET(S): at 12:02

## 2020-03-16 RX ADMIN — Medication 600 MILLIGRAM(S): at 12:40

## 2020-03-16 RX ADMIN — Medication 600 MILLIGRAM(S): at 12:03

## 2020-03-16 RX ADMIN — SODIUM CHLORIDE 3 MILLILITER(S): 9 INJECTION INTRAMUSCULAR; INTRAVENOUS; SUBCUTANEOUS at 05:54

## 2020-03-16 RX ADMIN — Medication 600 MILLIGRAM(S): at 05:53

## 2020-03-16 RX ADMIN — Medication 600 MILLIGRAM(S): at 06:30

## 2020-03-16 NOTE — PROGRESS NOTE ADULT - PROBLEM SELECTOR PLAN 1
- BPs wnl  - Pain well controlled, continue current pain regimen  - Increase ambulation, SCDs when not ambulating  - Continue regular diet  - Discharge planning     Hayley Bliss PGY1

## 2020-03-16 NOTE — PROGRESS NOTE ADULT - SUBJECTIVE AND OBJECTIVE BOX
OB Progress Note:  PPD#2    S: 29yo PPD#2 s/p . Patient feels well. Pain is well controlled. She is tolerating a regular diet and passing flatus. She is voiding spontaneously, and ambulating without difficulty. Denies CP/SOB. Denies lightheadedness/dizziness. Denies N/V. Denies heavy vaginal bleeding.    O:  Vitals:   Vital Signs Last 24 Hrs  T(C): 36.7 (16 Mar 2020 05:45), Max: 36.9 (15 Mar 2020 21:00)  T(F): 98.1 (16 Mar 2020 05:45), Max: 98.4 (15 Mar 2020 21:00)  HR: 78 (16 Mar 2020 05:45) (71 - 93)  BP: 134/77 (16 Mar 2020 05:45) (120/75 - 144/86)  BP(mean): --  RR: 19 (16 Mar 2020 05:45) (16 - 19)  SpO2: 99% (16 Mar 2020 05:45) (99% - 100%)    MEDICATIONS  (STANDING):  acetaminophen   Tablet .. 975 milliGRAM(s) Oral <User Schedule>  ibuprofen  Tablet. 600 milliGRAM(s) Oral every 6 hours  prenatal multivitamin 1 Tablet(s) Oral daily  sodium chloride 0.9% lock flush 3 milliLiter(s) IV Push every 8 hours    MEDICATIONS  (PRN):  benzocaine 20%/menthol 0.5% Spray 1 Spray(s) Topical every 6 hours PRN for Perineal discomfort  dibucaine 1% Ointment 1 Application(s) Topical every 6 hours PRN Perineal discomfort  diphenhydrAMINE 25 milliGRAM(s) Oral every 6 hours PRN Pruritus  glycerin Suppository - Adult 1 Suppository(s) Rectal at bedtime PRN Constipation  hydrocortisone 1% Cream 1 Application(s) Topical every 6 hours PRN Moderate Pain (4-6)  lanolin Ointment 1 Application(s) Topical every 6 hours PRN nipple soreness  magnesium hydroxide Suspension 30 milliLiter(s) Oral two times a day PRN Constipation  oxyCODONE    IR 5 milliGRAM(s) Oral every 3 hours PRN Moderate to Severe Pain (4-10)  oxyCODONE    IR 5 milliGRAM(s) Oral once PRN Moderate to Severe Pain (4-10)  pramoxine 1%/zinc 5% Cream 1 Application(s) Topical every 4 hours PRN Moderate Pain (4-6)  simethicone 80 milliGRAM(s) Chew every 4 hours PRN Gas  witch hazel Pads 1 Application(s) Topical every 4 hours PRN Perineal discomfort      Labs:  Blood type: A Positive  Rubella IgG: RPR: Negative                          12.7   10.48 >-----------< 172    (  @ 09:50 )             39.1                        12.8   9.32 >-----------< 167    (  @ 23:59 )             38.9                        12.5   7.19 >-----------< 179    (  @ 17:25 )             38.0    20 @ 09:50      134<L>  |  103  |  5<L>  ----------------------------<  90  3.9   |  19<L>  |  0.56    20 @ 23:59      132<L>  |  102  |  4<L>  ----------------------------<  79  3.6   |  19<L>  |  0.51    20 @ 17:25      134<L>  |  104  |  4<L>  ----------------------------<  66<L>  4.0   |  19<L>  |  0.56        Ca    9.0      14 Mar 2020 09:50  Ca    9.1      13 Mar 2020 23:59  Ca    9.7      13 Mar 2020 17:25  Mg     4.8<H>     03-15  Mg     4.8<H>     03-15  Mg     6.0<H>     03-14  Mg     5.2<H>     03-14  Mg     4.6<H>     03-14  Mg     4.6<H>     03-14  Mg     3.9<H>         TPro  6.1  /  Alb  3.1<L>  /  TBili  0.3  /  DBili  x   /  AST  24  /  ALT  22  /  AlkPhos  460<H>  20 @ 09:50  TPro  6.1  /  Alb  3.3  /  TBili  0.3  /  DBili  x   /  AST  25  /  ALT  23  /  AlkPhos  460<H>  20 @ 23:59  TPro  6.1  /  Alb  3.1<L>  /  TBili  0.3  /  DBili  x   /  AST  27  /  ALT  22  /  AlkPhos  428<H>  20 @ 17:25          Physical Exam:  General: NAD  Abdomen: soft, non-tender, non-distended, fundus firm  Vaginal: No heavy vaginal bleeding  Extremities: No erythema/edema

## 2020-04-06 ENCOUNTER — TRANSCRIPTION ENCOUNTER (OUTPATIENT)
Age: 30
End: 2020-04-06

## 2020-04-16 ENCOUNTER — TRANSCRIPTION ENCOUNTER (OUTPATIENT)
Age: 30
End: 2020-04-16

## 2020-06-04 PROBLEM — Z78.9 OTHER SPECIFIED HEALTH STATUS: Chronic | Status: ACTIVE | Noted: 2020-03-12

## 2020-06-05 ENCOUNTER — APPOINTMENT (OUTPATIENT)
Dept: OBGYN | Facility: CLINIC | Age: 30
End: 2020-06-05
Payer: COMMERCIAL

## 2020-06-05 PROCEDURE — 0503F POSTPARTUM CARE VISIT: CPT

## 2020-08-12 ENCOUNTER — APPOINTMENT (OUTPATIENT)
Dept: OBGYN | Facility: CLINIC | Age: 30
End: 2020-08-12
Payer: COMMERCIAL

## 2020-08-12 PROCEDURE — 0503F POSTPARTUM CARE VISIT: CPT

## 2021-02-25 NOTE — CHART NOTE - NSCHARTNOTESELECT_GEN_ALL_CORE
labor note You can access the FollowMyHealth Patient Portal offered by [facility] by registering at the following website: [email or web address]. By joining [facility]’s FollowMyHealth portal, you will also be able to view your health information using other applications (apps) compatible with our system.

## 2021-03-01 ENCOUNTER — APPOINTMENT (OUTPATIENT)
Dept: OBGYN | Facility: CLINIC | Age: 31
End: 2021-03-01
Payer: COMMERCIAL

## 2021-03-01 PROCEDURE — 99072 ADDL SUPL MATRL&STAF TM PHE: CPT

## 2021-03-01 PROCEDURE — 99212 OFFICE O/P EST SF 10 MIN: CPT

## 2021-03-22 ENCOUNTER — APPOINTMENT (OUTPATIENT)
Dept: OBGYN | Facility: CLINIC | Age: 31
End: 2021-03-22
Payer: COMMERCIAL

## 2021-03-22 PROCEDURE — 99072 ADDL SUPL MATRL&STAF TM PHE: CPT

## 2021-03-22 PROCEDURE — 96372 THER/PROPH/DIAG INJ SC/IM: CPT

## 2021-06-14 ENCOUNTER — APPOINTMENT (OUTPATIENT)
Dept: OBGYN | Facility: CLINIC | Age: 31
End: 2021-06-14
Payer: COMMERCIAL

## 2021-06-14 PROCEDURE — 96372 THER/PROPH/DIAG INJ SC/IM: CPT

## 2021-07-21 DIAGNOSIS — Z83.3 FAMILY HISTORY OF DIABETES MELLITUS: ICD-10-CM

## 2021-07-21 DIAGNOSIS — R87.618 OTHER ABNORMAL CYTOLOGICAL FINDINGS ON SPECIMENS FROM CERVIX UTERI: ICD-10-CM

## 2021-07-21 DIAGNOSIS — B97.7 PAPILLOMAVIRUS AS THE CAUSE OF DISEASES CLASSIFIED ELSEWHERE: ICD-10-CM

## 2021-07-21 DIAGNOSIS — Z80.41 FAMILY HISTORY OF MALIGNANT NEOPLASM OF OVARY: ICD-10-CM

## 2021-07-21 DIAGNOSIS — Z86.19 PERSONAL HISTORY OF OTHER INFECTIOUS AND PARASITIC DISEASES: ICD-10-CM

## 2021-07-21 DIAGNOSIS — B95.1 STREPTOCOCCUS, GROUP B, AS THE CAUSE OF DISEASES CLASSIFIED ELSEWHERE: ICD-10-CM

## 2021-07-21 DIAGNOSIS — J45.909 UNSPECIFIED ASTHMA, UNCOMPLICATED: ICD-10-CM

## 2021-07-21 DIAGNOSIS — Z80.9 FAMILY HISTORY OF MALIGNANT NEOPLASM, UNSPECIFIED: ICD-10-CM

## 2021-07-21 DIAGNOSIS — Z78.9 OTHER SPECIFIED HEALTH STATUS: ICD-10-CM

## 2021-07-21 RX ORDER — MEDROXYPROGESTERONE ACETATE 150 MG/ML
150 INJECTION, SUSPENSION INTRAMUSCULAR
Refills: 0 | Status: ACTIVE | COMMUNITY

## 2021-09-13 ENCOUNTER — APPOINTMENT (OUTPATIENT)
Dept: OBGYN | Facility: CLINIC | Age: 31
End: 2021-09-13
Payer: COMMERCIAL

## 2021-09-13 ENCOUNTER — MED ADMIN CHARGE (OUTPATIENT)
Age: 31
End: 2021-09-13

## 2021-09-13 VITALS — WEIGHT: 172 LBS | SYSTOLIC BLOOD PRESSURE: 126 MMHG | DIASTOLIC BLOOD PRESSURE: 76 MMHG

## 2021-09-13 DIAGNOSIS — Z00.00 ENCOUNTER FOR GENERAL ADULT MEDICAL EXAMINATION W/OUT ABNORMAL FINDINGS: ICD-10-CM

## 2021-09-13 PROCEDURE — S0613: CPT

## 2021-09-13 PROCEDURE — 99214 OFFICE O/P EST MOD 30 MIN: CPT

## 2021-09-13 RX ORDER — MEDROXYPROGESTERONE ACETATE 150 MG/ML
150 INJECTION, SUSPENSION INTRAMUSCULAR
Qty: 0 | Refills: 0 | Status: COMPLETED | OUTPATIENT
Start: 2021-09-13

## 2021-09-13 RX ADMIN — MEDROXYPROGESTERONE ACETATE 0 MG/ML: 150 INJECTION, SUSPENSION INTRAMUSCULAR at 00:00

## 2021-09-13 NOTE — PHYSICAL EXAM
[Appropriately responsive] : appropriately responsive [Alert] : alert [No Acute Distress] : no acute distress [No Lymphadenopathy] : no lymphadenopathy [Regular Rate Rhythm] : regular rate rhythm [No Murmurs] : no murmurs [Clear to Auscultation B/L] : clear to auscultation bilaterally [Soft] : soft [Non-tender] : non-tender [Non-distended] : non-distended [No HSM] : No HSM [No Lesions] : no lesions [No Mass] : no mass [Oriented x3] : oriented x3 [Examination Of The Breasts] : a normal appearance [No Masses] : no breast masses were palpable [Labia Majora] : normal [Labia Minora] : normal [Discharge] : discharge [Moderate] : moderate [Dale] : yellow [Thin] : thin [Normal] : normal [Uterine Adnexae] : normal

## 2021-09-13 NOTE — HISTORY OF PRESENT ILLNESS
[TextBox_4] : 31 yr old for annual/ routine visit. patient also here for depo injection. patient reports irregular bleeding with depo specifically the last month prior to next injection.  [FreeTextEntry1] : 07/01/2021 [Yes] : Patient has concerns regarding sex [Currently Active] : currently active [Men] : men [No] : No

## 2021-09-13 NOTE — PLAN
[FreeTextEntry1] : 31 yr old for depo injection\par -patient due for annual \par -PAP, GC/CT DONE \par - bilateral breast exam, educated on monthly SBE\par - BD affirm sent \par -DEPO GIVEN\par  -f/u in 3 months for next depov injection

## 2021-09-14 LAB
CANDIDA VAG CYTO: NOT DETECTED
G VAGINALIS+PREV SP MTYP VAG QL MICRO: NOT DETECTED
T VAGINALIS VAG QL WET PREP: NOT DETECTED

## 2021-09-15 LAB
C TRACH RRNA SPEC QL NAA+PROBE: NOT DETECTED
HPV HIGH+LOW RISK DNA PNL CVX: NOT DETECTED
HPV HIGH+LOW RISK DNA PNL CVX: NOT DETECTED
N GONORRHOEA RRNA SPEC QL NAA+PROBE: NOT DETECTED
SOURCE AMPLIFICATION: NORMAL

## 2021-09-17 LAB — CYTOLOGY CVX/VAG DOC THIN PREP: NORMAL

## 2021-11-26 NOTE — OB RN DELIVERY SUMMARY - AMNIOTIC FLUID AMOUNT, LABOR
Patient: Madelyn Kenny Date: 2021   : 1950 Attending: Alma Lopez MD   71 year old female        Chief complaint: Hyperglycemia  (primary encounter diagnosis)  Acute renal failure, unspecified acute renal failure type (CMS/HCC)  Gastrointestinal hemorrhage, unspecified gastrointestinal hemorrhage type  Upper GI bleed  Black stools  Acute renal failure with acute tubular necrosis superimposed on stage 3b chronic kidney disease (CMS/HCC)     HISTORY:  The patient is a 71-year-old female who came into the ER originally because of increasing weakness and fatigue.  Her family brought her in from the primary care doctor's office.  At that point she had been found to have hyperglycemia.  She described increasing thirst and urinary frequency along with the weakness.  No chest pain.  No shortness of breath.  The patient apparently had a fall earlier in the day.  She felt that her legs are getting more and more weak and then finally just gave out.  She fell onto her side.  She did not hit her head.  She did not lose consciousness.  There was no chest pain or shortness of breath.  No injury from the fall.  She denies any recent nausea, vomiting, diarrhea or loss of appetite.  No urinary complaints such as dysuria, hematuria, urgency or hesitancy.  In the emergency room and now on admission labs have been drawn, and she has been found to have guaiac positive stool with anemia.  Her serum electrolytes are significant for hyponatremia and acute renal failure and, therefore, Nephrology has been asked to help evaluate.  The patient at this point is sitting up in a chair, feeling much better, still somewhat fatigued, but no overt weakness.  No shortness of breath.  Again, no urinary complaints.  She was COVID negative.    Subjective:   21: Awake, no chest pain, no SOB, no urinary complaints, UO 1050 ml, creat 1.37  21: BP on the low side . T 100.1, UO 1050, creat 1.40. vomited BRB last evening.    11/24/21: BP 99/51, Creat 1.35. hgb 7.0 down from 8.1  11/25/21: no over night events, feels good, no SOB or Chest pain, no signs of bleeding, Crea is stable  11/26/21: Alert, comfortable, . 136/62,     ROS: 12 pt ros o/w negative  Social History     Socioeconomic History   • Marital status: Single     Spouse name: Not on file   • Number of children: Not on file   • Years of education: Not on file   • Highest education level: Not on file   Occupational History   • Not on file   Tobacco Use   • Smoking status: Never Smoker   • Smokeless tobacco: Never Used   Vaping Use   • Vaping Use: never used   Substance and Sexual Activity   • Alcohol use: No     Comment: rarely, special occasion   • Drug use: Not Currently     Types: Marijuana     Comment: marijuana on special occasions   • Sexual activity: Never   Other Topics Concern   •  Service Not Asked   • Blood Transfusions Not Asked   • Caffeine Concern Not Asked   • Occupational Exposure Not Asked   • Hobby Hazards Not Asked   • Sleep Concern Not Asked   • Stress Concern Not Asked   • Weight Concern Not Asked   • Special Diet Not Asked   • Back Care Not Asked   • Exercise Not Asked   • Bike Helmet Not Asked   • Seat Belt Yes   • Self-Exams Not Asked   Social History Narrative   • Not on file     Social Determinants of Health     Financial Resource Strain:    • Social Determinants: Financial Resource Strain: Not on file   Food Insecurity:    • Social Determinants: Food Insecurity: Not on file   Transportation Needs:    • Lack of Transportation (Medical): Not on file   • Lack of Transportation (Non-Medical): Not on file   Physical Activity:    • Days of Exercise per Week: Not on file   • Minutes of Exercise per Session: Not on file   Stress:    • Social Determinants: Stress: Not on file   Social Connections:    • Social Determinants: Social Connections: Not on file   Intimate Partner Violence: Not At Risk   • Social Determinants: Intimate Partner Violence Past  Fear: No   • Social Determinants: Intimate Partner Violence Current Fear: No          Family History   Problem Relation Age of Onset   • Diabetes Mother    • Cancer Father    • Diabetes Brother             Vital Last Value 24 Hour Range   Temperature 98.6 °F (37 °C) Temp  Min: 98.1 °F (36.7 °C)  Max: 98.6 °F (37 °C)   Pulse 80 Pulse  Min: 66  Max: 80   Respiratory 18 Resp  Min: 18  Max: 18   Blood Pressure 136/62 BP  Min: 101/50  Max: 136/62   Art BP   No data recorded   Pulse Oximetry 100 % SpO2  Min: 96 %  Max: 100 %     Vital Today Admitted   Weight 69 kg (152 lb 1.9 oz) Weight: 59.5 kg (131 lb 2.8 oz)   Height N/A Height: 5' 1\" (154.9 cm)   BMI N/A BMI (Calculated): 25.58     Weight over the past 48 Hours:  Patient Vitals for the past 48 hrs:   Weight   11/26/21 0550 69 kg (152 lb 1.9 oz)        Hemodynamics:      Last Value 24 Hour Range   CVP   No data recorded   PAS/PAD   No data recorded   PCWP   No data recorded   CO   No data recorded   CI   No data recorded   SVR   No data recorded   SV02   No data recorded     Intake/Output:    Last Stool Occurrence: 1 (11/24/21 0400)    No intake/output data recorded.    I/O last 3 completed shifts:  In: 210 [P.O.:210]  Out: -     No intake or output data in the 24 hours ending 11/26/21 1051    Medications/Infusions:  Scheduled:   • furosemide  20 mg Intravenous Once   • brimonidine  1 drop Left Eye BID   • timolol  1 drop Left Eye BID   • dorzolamide  1 drop Left Eye BID   • pantoprazole  40 mg Oral Nightly   • sodium chloride (PF)  2 mL Intracatheter 2 times per day   • insulin lispro   Subcutaneous TID WC   • Potassium Standard Replacement Protocol   Does not apply See Admin Instructions   • Magnesium Standard Replacement Protocol   Does not apply See Admin Instructions   • latanoprost  1 drop Both Eyes Nightly   • metoPROLOL succinate  25 mg Oral Daily       Continuous Infusions:   • sodium chloride 0.9% infusion         Physical Exam:  HEENT: atraumatic,  normocephalic, pink conjunctivae, anicteric sclerae, eomi  C/L: clear to auscultation, no wheeze/rales/rhonchi  CVS: regular rate and rhythm, no rubs/gallop.   Abd: soft, rounded, nontender, not distended, no bruits, no palpable masses, bs pos  Ext: no clubbing, cyanosis or edema  Skin:no obvious rash, skin warm and dry  Neuro:no focal changes, no motor sensory deficits. Generalized weakness.   Psych:alert/orientated, normal affect    Laboratory Results:    Lab Results   Component Value Date    URIC 7.1 (H) 05/12/2021     Lab Results   Component Value Date    VB12 564 05/13/2021    INTAC 97 (H) 05/11/2021    PST 29 11/20/2021    STBLD Negative 11/19/2021    FERR 357 (H) 11/19/2021       Urine Panel  Lab Results   Component Value Date    UKET Negative 11/19/2021    UPROT Negative 11/19/2021    UWBC Negative 11/19/2021    URBC Moderate (A) 11/19/2021    UNITR Negative 11/19/2021    UBILI Negative 11/19/2021    UPH 5.0 11/19/2021    USPG 1.006 11/19/2021    UBACTR LARGE (A) 05/14/2018       Recent Labs     11/24/21  0542 11/24/21  1154 11/25/21  0504 11/25/21  1147 11/26/21  0632   SODIUM 135  --  138  --   --    POTASSIUM 4.0  --  4.0  --   --    CHLORIDE 105  --  107  --   --    CO2 23  --  22  --   --    ANIONGAP 11  --  13  --   --    BUN 31*  --  23*  --   --    CREATININE 1.35*  --  1.37*  --   --    GLUCOSE 177*  --  162*  --   --    HGB 7.0*   < > 7.1* 7.3* 6.6*   HCT 21.1*   < > 22.2* 22.5* 20.0*    < > = values in this interval not displayed.           Diagnosis/Plan:    · ARF/CRF3: nonoliguric in face of apparent GIB. Prerenal azotemia. Creat much improved on IVF. Continue hold on ACE/diuretic. Volume/resp stable. Creat now at 1.37. Down from peak 3.0 (11/19/21)   -Baseline creat appears to be 1.3-1.6  · Hyponatremia: improved on ivf. Mixing iv meds in .9ns as able. Resolved.   · Anemia: GIB. Fe levels adequate. GI evaluation in progress.  · HTN: BP on the low side.. ACE on hold.    -Crea and lytes are  stable.  -Recheck BMP in am if not discharged    Mariela Stout MD            within normal limits

## 2021-12-13 ENCOUNTER — APPOINTMENT (OUTPATIENT)
Dept: OBGYN | Facility: CLINIC | Age: 31
End: 2021-12-13
Payer: COMMERCIAL

## 2021-12-13 VITALS — WEIGHT: 191 LBS | SYSTOLIC BLOOD PRESSURE: 123 MMHG | DIASTOLIC BLOOD PRESSURE: 76 MMHG

## 2021-12-13 PROCEDURE — 99212 OFFICE O/P EST SF 10 MIN: CPT

## 2021-12-13 RX ORDER — MEDROXYPROGESTERONE ACETATE 150 MG/ML
150 INJECTION, SUSPENSION INTRAMUSCULAR
Refills: 0 | Status: COMPLETED | OUTPATIENT
Start: 2021-12-13

## 2021-12-13 RX ADMIN — MEDROXYPROGESTERONE ACETATE 0 MG/ML: 150 INJECTION, SUSPENSION INTRAMUSCULAR at 00:00

## 2021-12-13 NOTE — PLAN
[FreeTextEntry1] : 31 YR OLD FOR DEPO INJECTION \par PATIENT REPORTS DECREASED LIBIDO. DISCUSSED OPTIONS TO CHANGE BCP\par -[employer] website given to patient research different types of birth control options \par - f/u in 3 months if would like to continue depo injection

## 2022-03-07 ENCOUNTER — APPOINTMENT (OUTPATIENT)
Dept: OBGYN | Facility: CLINIC | Age: 32
End: 2022-03-07
Payer: COMMERCIAL

## 2022-03-07 PROCEDURE — 58300 INSERT INTRAUTERINE DEVICE: CPT

## 2022-03-07 PROCEDURE — 99213 OFFICE O/P EST LOW 20 MIN: CPT

## 2022-03-07 NOTE — PROCEDURE
[IUD Placement] : intrauterine device (IUD) placement [Prevention of Pregnancy] : prevention of pregnancy [Risks] : risks [Benefits] : benefits [Patient] : patient [Neg Pregnancy Test] : negative pregnancy test [No Premedication] : No premedication [Easy Passage] : Easy passage [Sounded to ___ cm] : sounded to [unfilled] ~Ucm [Kyleena IUD] : Kyleena IUD [Tolerated Well] : Patient tolerated the procedure well [No Complications] : No complications [None] : None [LMPDate] : 02/15/2022 [de-identified] : [id number] [de-identified] : 05/2023

## 2022-03-07 NOTE — PLAN
[FreeTextEntry1] : 32 yr old for IUD insertion.\par - decreased sex drive on depo. opted for low dose IUD \par - Negative urine pregnancy test \par -Kyleena placed without complication \par - placed under ultrasound guidance, transvaginal sonogram confirmed IUD at fundus\par - f/u in 1 month for iud placement check

## 2022-04-04 ENCOUNTER — APPOINTMENT (OUTPATIENT)
Dept: ANTEPARTUM | Facility: CLINIC | Age: 32
End: 2022-04-04
Payer: COMMERCIAL

## 2022-04-04 ENCOUNTER — APPOINTMENT (OUTPATIENT)
Dept: OBGYN | Facility: CLINIC | Age: 32
End: 2022-04-04

## 2022-04-04 VITALS — DIASTOLIC BLOOD PRESSURE: 85 MMHG | SYSTOLIC BLOOD PRESSURE: 125 MMHG | WEIGHT: 188 LBS

## 2022-04-04 PROCEDURE — 76857 US EXAM PELVIC LIMITED: CPT

## 2022-04-04 PROCEDURE — 76830 TRANSVAGINAL US NON-OB: CPT

## 2022-05-12 NOTE — LACTATION INITIAL EVALUATION - NS LACT CON REASON FOR REQ
----- Message from Krys Emanuel sent at 5/12/2022  1:19 PM CDT -----  Contact: [phone] @ Patient  Pharmacy is calling to clarify an RX.  RX name:  ciprofloxacin-dexamethasone 0.3-0.1% (CIPRODEX) 0.3-0.1 % DrpS      What do they need to clarify:  INSURANCE WILL NOT COVER AND NEED OTHER RX  Comments:      
primaparous mom

## 2022-10-26 NOTE — OB RN DELIVERY SUMMARY - NS_GBSABXNAME_OBGYN_ALL_OB_FT
Ampicillin Zygomaticofacial Flap Text: Given the location of the defect, shape of the defect and the proximity to free margins a zygomaticofacial flap was deemed most appropriate for repair.  Using a sterile surgical marker, the appropriate flap was drawn incorporating the defect and placing the expected incisions within the relaxed skin tension lines where possible. The area thus outlined was incised deep to adipose tissue with a #15 scalpel blade with preservation of a vascular pedicle.  The skin margins were undermined to an appropriate distance in all directions utilizing iris scissors.  The flap was then placed into the defect and anchored with interrupted buried subcutaneous sutures.

## 2022-10-27 ENCOUNTER — APPOINTMENT (OUTPATIENT)
Dept: OBGYN | Facility: CLINIC | Age: 32
End: 2022-10-27

## 2022-10-27 VITALS
HEIGHT: 66 IN | WEIGHT: 185 LBS | SYSTOLIC BLOOD PRESSURE: 119 MMHG | DIASTOLIC BLOOD PRESSURE: 79 MMHG | BODY MASS INDEX: 29.73 KG/M2

## 2022-10-27 DIAGNOSIS — Z01.411 ENCOUNTER FOR GYNECOLOGICAL EXAMINATION (GENERAL) (ROUTINE) WITH ABNORMAL FINDINGS: ICD-10-CM

## 2022-10-27 DIAGNOSIS — Z01.419 ENCOUNTER FOR GYNECOLOGICAL EXAMINATION (GENERAL) (ROUTINE) W/OUT ABNORMAL FINDINGS: ICD-10-CM

## 2022-10-27 PROCEDURE — 99395 PREV VISIT EST AGE 18-39: CPT

## 2022-10-27 NOTE — PHYSICAL EXAM
[Appropriately responsive] : appropriately responsive [Alert] : alert [No Acute Distress] : no acute distress [No Lymphadenopathy] : no lymphadenopathy [Regular Rate Rhythm] : regular rate rhythm [No Murmurs] : no murmurs [Clear to Auscultation B/L] : clear to auscultation bilaterally [Soft] : soft [Non-tender] : non-tender [Non-distended] : non-distended [No HSM] : No HSM [No Lesions] : no lesions [No Mass] : no mass [Oriented x3] : oriented x3 [Examination Of The Breasts] : a normal appearance [No Masses] : no breast masses were palpable [Labia Majora] : normal [Labia Minora] : normal [Normal] : normal [Uterine Adnexae] : normal [IUD String] : an IUD string was noted

## 2022-10-27 NOTE — PLAN
[FreeTextEntry1] : 32 year old presenting for annual exam.\par -uterus is normal sized and mobile\par -f/u PAP and GC/CT done today\par -contraception: Mirena IUD \par -f/u PRN

## 2022-10-27 NOTE — HISTORY OF PRESENT ILLNESS
[Patient reported PAP Smear was normal] : Patient reported PAP Smear was normal [HIV Test offered] : HIV Test offered [Syphilis test offered] : Syphilis test offered [Gonorrhea test offered] : Gonorrhea test offered [Chlamydia test offered] : Chlamydia test offered [HPV test offered] : HPV test offered [Hepatitis B test offered] : Hepatitis B test offered [Hepatitis C test offered] : Hepatitis C test offered [Y] : Positive pregnancy history [LMP unknown] : LMP unknown [FreeTextEntry1] : Patient is a 32 year old presenting for an annual visit. She is feeling well and is without complaints. She denies vaginal itching, odor and discharge. Denies urinary urgency, frequency and dysuria. Pt is sexually active and has Mirena IUD in place. \par  [PapSmeardate] : 09/2022 [PGxTotal] : 1 [[facility]] : [facility] [[facility] Surpriseiving] : 1

## 2022-10-31 ENCOUNTER — NON-APPOINTMENT (OUTPATIENT)
Age: 32
End: 2022-10-31

## 2022-10-31 LAB
C TRACH RRNA SPEC QL NAA+PROBE: NOT DETECTED
HPV 16 E6+E7 MRNA CVX QL NAA+PROBE: NOT DETECTED
HPV HIGH+LOW RISK DNA PNL CVX: NOT DETECTED
HPV18+45 E6+E7 MRNA CVX QL NAA+PROBE: NOT DETECTED
N GONORRHOEA RRNA SPEC QL NAA+PROBE: NOT DETECTED
SOURCE AMPLIFICATION: NORMAL

## 2022-11-03 ENCOUNTER — NON-APPOINTMENT (OUTPATIENT)
Age: 32
End: 2022-11-03

## 2022-11-03 LAB — CYTOLOGY CVX/VAG DOC THIN PREP: NORMAL

## 2023-03-05 ENCOUNTER — NON-APPOINTMENT (OUTPATIENT)
Age: 33
End: 2023-03-05

## 2023-03-15 ENCOUNTER — NON-APPOINTMENT (OUTPATIENT)
Age: 33
End: 2023-03-15

## 2023-12-18 ENCOUNTER — ASOB RESULT (OUTPATIENT)
Age: 33
End: 2023-12-18

## 2023-12-18 ENCOUNTER — APPOINTMENT (OUTPATIENT)
Dept: ANTEPARTUM | Facility: CLINIC | Age: 33
End: 2023-12-18
Payer: COMMERCIAL

## 2023-12-18 ENCOUNTER — APPOINTMENT (OUTPATIENT)
Dept: OBGYN | Facility: CLINIC | Age: 33
End: 2023-12-18
Payer: COMMERCIAL

## 2023-12-18 VITALS — BODY MASS INDEX: 28.41 KG/M2 | DIASTOLIC BLOOD PRESSURE: 82 MMHG | WEIGHT: 176 LBS | SYSTOLIC BLOOD PRESSURE: 122 MMHG

## 2023-12-18 DIAGNOSIS — Z01.419 ENCOUNTER FOR GYNECOLOGICAL EXAMINATION (GENERAL) (ROUTINE) W/OUT ABNORMAL FINDINGS: ICD-10-CM

## 2023-12-18 PROCEDURE — 76830 TRANSVAGINAL US NON-OB: CPT

## 2023-12-18 PROCEDURE — 99214 OFFICE O/P EST MOD 30 MIN: CPT | Mod: 25

## 2023-12-18 PROCEDURE — 76857 US EXAM PELVIC LIMITED: CPT | Mod: 59

## 2023-12-18 PROCEDURE — 99395 PREV VISIT EST AGE 18-39: CPT

## 2023-12-18 NOTE — PHYSICAL EXAM
[Chaperone Present] : A chaperone was present in the examining room during all aspects of the physical examination [Appropriately responsive] : appropriately responsive [Alert] : alert [No Acute Distress] : no acute distress [Soft] : soft [Non-tender] : non-tender [Non-distended] : non-distended [No HSM] : No HSM [No Lesions] : no lesions [No Mass] : no mass [Oriented x3] : oriented x3 [Examination Of The Breasts] : a normal appearance [Breast Palpation Diffuse Fibrous Tissue Bilateral] : fibrocystic changes [No Masses] : no breast masses were palpable [Labia Majora] : normal [Labia Minora] : normal [IUD String] : an IUD string was noted [Normal] : normal [Uterine Adnexae] : normal

## 2023-12-19 RX ORDER — METRONIDAZOLE 500 MG/1
500 TABLET ORAL TWICE DAILY
Qty: 28 | Refills: 0 | Status: ACTIVE | COMMUNITY
Start: 2023-12-19 | End: 1900-01-01

## 2023-12-21 LAB
CANDIDA VAG CYTO: NOT DETECTED
CYTOLOGY CVX/VAG DOC THIN PREP: NORMAL
G VAGINALIS+PREV SP MTYP VAG QL MICRO: DETECTED
HPV 16 E6+E7 MRNA CVX QL NAA+PROBE: NOT DETECTED
HPV18+45 E6+E7 MRNA CVX QL NAA+PROBE: NOT DETECTED
T VAGINALIS VAG QL WET PREP: NOT DETECTED

## 2023-12-21 NOTE — HISTORY OF PRESENT ILLNESS
[Currently Active] : currently active [Men] : men [No] : No [Yes] : Yes [Y] : Patient uses contraception [IUD] : has an intrauterine device [LMPDate] : 12/11/2023 [de-identified] : Kyleena [PGxTotal] : 1 [[facility] CenterxFulerm] : 1 [[facility] Servicesiving] : 1 [FreeTextEntry1] :  x1 (PEC)  [FreeTextEntry3] : IUD

## 2023-12-21 NOTE — PLAN
[FreeTextEntry1] : 32 y/o P1 presenting for annual exam/GYN sono  -GYN sono done today for IUD surveillance, IUD in situ  -f/u pap and GC/CT -f/u bd affirm sent -Contraception: Kyleena IUD in place  -referral given to genetics -req given for breast sono d/t dense and cystic breasts  -f/u PRN

## 2024-01-16 ENCOUNTER — APPOINTMENT (OUTPATIENT)
Dept: ULTRASOUND IMAGING | Facility: CLINIC | Age: 34
End: 2024-01-16
Payer: COMMERCIAL

## 2024-01-16 ENCOUNTER — RESULT REVIEW (OUTPATIENT)
Age: 34
End: 2024-01-16

## 2024-01-16 PROCEDURE — 76641 ULTRASOUND BREAST COMPLETE: CPT | Mod: 50

## 2024-01-26 ENCOUNTER — ASOB RESULT (OUTPATIENT)
Age: 34
End: 2024-01-26

## 2024-01-26 ENCOUNTER — APPOINTMENT (OUTPATIENT)
Dept: MATERNAL FETAL MEDICINE | Facility: CLINIC | Age: 34
End: 2024-01-26
Payer: COMMERCIAL

## 2024-01-26 PROCEDURE — 99202 OFFICE O/P NEW SF 15 MIN: CPT | Mod: 95

## 2024-01-26 PROCEDURE — 99212 OFFICE O/P EST SF 10 MIN: CPT | Mod: 95

## 2024-09-29 ENCOUNTER — NON-APPOINTMENT (OUTPATIENT)
Age: 34
End: 2024-09-29

## 2024-10-23 ENCOUNTER — EMERGENCY (EMERGENCY)
Facility: HOSPITAL | Age: 34
LOS: 1 days | Discharge: HOME | End: 2024-10-23
Admitting: EMERGENCY MEDICINE
Payer: COMMERCIAL

## 2024-10-23 VITALS
HEART RATE: 98 BPM | HEIGHT: 66 IN | SYSTOLIC BLOOD PRESSURE: 101 MMHG | RESPIRATION RATE: 20 BRPM | TEMPERATURE: 103 F | OXYGEN SATURATION: 98 % | DIASTOLIC BLOOD PRESSURE: 69 MMHG | WEIGHT: 173.94 LBS

## 2024-10-23 LAB
ADD ON TEST-SPECIMEN IN LAB: SIGNIFICANT CHANGE UP
ALBUMIN SERPL ELPH-MCNC: 4.1 G/DL — SIGNIFICANT CHANGE UP (ref 3.3–5)
ALP SERPL-CCNC: 70 U/L — SIGNIFICANT CHANGE UP (ref 40–120)
ALT FLD-CCNC: 10 U/L — SIGNIFICANT CHANGE UP (ref 4–33)
ANION GAP SERPL CALC-SCNC: 14 MMOL/L — SIGNIFICANT CHANGE UP (ref 7–14)
APPEARANCE UR: CLEAR — SIGNIFICANT CHANGE UP
AST SERPL-CCNC: 17 U/L — SIGNIFICANT CHANGE UP (ref 4–32)
BACTERIA # UR AUTO: ABNORMAL /HPF
BASOPHILS # BLD AUTO: 0.03 K/UL — SIGNIFICANT CHANGE UP (ref 0–0.2)
BASOPHILS NFR BLD AUTO: 0.2 % — SIGNIFICANT CHANGE UP (ref 0–2)
BILIRUB SERPL-MCNC: 0.3 MG/DL — SIGNIFICANT CHANGE UP (ref 0.2–1.2)
BILIRUB UR-MCNC: NEGATIVE — SIGNIFICANT CHANGE UP
BLOOD GAS VENOUS COMPREHENSIVE RESULT: SIGNIFICANT CHANGE UP
BUN SERPL-MCNC: 8 MG/DL — SIGNIFICANT CHANGE UP (ref 7–23)
CALCIUM SERPL-MCNC: 9.9 MG/DL — SIGNIFICANT CHANGE UP (ref 8.4–10.5)
CAST: 0 /LPF — SIGNIFICANT CHANGE UP (ref 0–4)
CHLORIDE SERPL-SCNC: 101 MMOL/L — SIGNIFICANT CHANGE UP (ref 98–107)
CO2 SERPL-SCNC: 20 MMOL/L — LOW (ref 22–31)
COLOR SPEC: YELLOW — SIGNIFICANT CHANGE UP
CREAT SERPL-MCNC: 0.74 MG/DL — SIGNIFICANT CHANGE UP (ref 0.5–1.3)
DIFF PNL FLD: NEGATIVE — SIGNIFICANT CHANGE UP
EGFR: 107 ML/MIN/1.73M2 — SIGNIFICANT CHANGE UP
EGFR: 107 ML/MIN/1.73M2 — SIGNIFICANT CHANGE UP
EOSINOPHIL # BLD AUTO: 0 K/UL — SIGNIFICANT CHANGE UP (ref 0–0.5)
EOSINOPHIL NFR BLD AUTO: 0 % — SIGNIFICANT CHANGE UP (ref 0–6)
FLUAV AG NPH QL: SIGNIFICANT CHANGE UP
FLUBV AG NPH QL: SIGNIFICANT CHANGE UP
GLUCOSE SERPL-MCNC: 98 MG/DL — SIGNIFICANT CHANGE UP (ref 70–99)
GLUCOSE UR QL: NEGATIVE MG/DL — SIGNIFICANT CHANGE UP
HCG SERPL-ACNC: <1 MIU/ML — SIGNIFICANT CHANGE UP
HCT VFR BLD CALC: 37.4 % — SIGNIFICANT CHANGE UP (ref 34.5–45)
HGB BLD-MCNC: 12.4 G/DL — SIGNIFICANT CHANGE UP (ref 11.5–15.5)
IANC: 13.6 K/UL — HIGH (ref 1.8–7.4)
IMM GRANULOCYTES NFR BLD AUTO: 0.6 % — SIGNIFICANT CHANGE UP (ref 0–0.9)
KETONES UR-MCNC: 80 MG/DL
LEUKOCYTE ESTERASE UR-ACNC: NEGATIVE — SIGNIFICANT CHANGE UP
LYMPHOCYTES # BLD AUTO: 1.38 K/UL — SIGNIFICANT CHANGE UP (ref 1–3.3)
LYMPHOCYTES # BLD AUTO: 8.4 % — LOW (ref 13–44)
MCHC RBC-ENTMCNC: 29.4 PG — SIGNIFICANT CHANGE UP (ref 27–34)
MCHC RBC-ENTMCNC: 33.2 GM/DL — SIGNIFICANT CHANGE UP (ref 32–36)
MCV RBC AUTO: 88.6 FL — SIGNIFICANT CHANGE UP (ref 80–100)
MONOCYTES # BLD AUTO: 1.32 K/UL — HIGH (ref 0–0.9)
MONOCYTES NFR BLD AUTO: 8 % — SIGNIFICANT CHANGE UP (ref 2–14)
NEUTROPHILS # BLD AUTO: 13.6 K/UL — HIGH (ref 1.8–7.4)
NEUTROPHILS NFR BLD AUTO: 82.8 % — HIGH (ref 43–77)
NITRITE UR-MCNC: NEGATIVE — SIGNIFICANT CHANGE UP
NRBC # BLD AUTO: 0 K/UL — SIGNIFICANT CHANGE UP (ref 0–0)
NRBC # BLD: 0 /100 WBCS — SIGNIFICANT CHANGE UP (ref 0–0)
NRBC # FLD: 0 K/UL — SIGNIFICANT CHANGE UP (ref 0–0)
NRBC BLD-RTO: 0 /100 WBCS — SIGNIFICANT CHANGE UP (ref 0–0)
PH UR: 6.5 — SIGNIFICANT CHANGE UP (ref 5–8)
PLATELET # BLD AUTO: 253 K/UL — SIGNIFICANT CHANGE UP (ref 150–400)
POTASSIUM SERPL-MCNC: 3.4 MMOL/L — LOW (ref 3.5–5.3)
POTASSIUM SERPL-SCNC: 3.4 MMOL/L — LOW (ref 3.5–5.3)
PROT SERPL-MCNC: 7.5 G/DL — SIGNIFICANT CHANGE UP (ref 6–8.3)
PROT UR-MCNC: 30 MG/DL
RBC # BLD: 4.22 M/UL — SIGNIFICANT CHANGE UP (ref 3.8–5.2)
RBC # FLD: 13 % — SIGNIFICANT CHANGE UP (ref 10.3–14.5)
RBC CASTS # UR COMP ASSIST: 6 /HPF — HIGH (ref 0–4)
REVIEW: SIGNIFICANT CHANGE UP
RSV RNA NPH QL NAA+NON-PROBE: SIGNIFICANT CHANGE UP
SARS-COV-2 RNA SPEC QL NAA+PROBE: SIGNIFICANT CHANGE UP
SODIUM SERPL-SCNC: 135 MMOL/L — SIGNIFICANT CHANGE UP (ref 135–145)
SP GR SPEC: 1.01 — SIGNIFICANT CHANGE UP (ref 1–1.03)
SQUAMOUS # UR AUTO: 7 /HPF — HIGH (ref 0–5)
UROBILINOGEN FLD QL: 0.2 MG/DL — SIGNIFICANT CHANGE UP (ref 0.2–1)
WBC # BLD: 16.43 K/UL — HIGH (ref 3.8–10.5)
WBC # FLD AUTO: 16.43 K/UL — HIGH (ref 3.8–10.5)
WBC UR QL: 2 /HPF — SIGNIFICANT CHANGE UP (ref 0–5)

## 2024-10-23 PROCEDURE — 71046 X-RAY EXAM CHEST 2 VIEWS: CPT | Mod: 26

## 2024-10-23 PROCEDURE — 99284 EMERGENCY DEPT VISIT MOD MDM: CPT

## 2024-10-23 RX ORDER — ACETAMINOPHEN 500 MG/5ML
1000 LIQUID (ML) ORAL ONCE
Refills: 0 | Status: COMPLETED | OUTPATIENT
Start: 2024-10-23 | End: 2024-10-23

## 2024-10-23 RX ADMIN — Medication 2000 MILLILITER(S): at 22:28

## 2024-10-23 RX ADMIN — Medication 400 MILLIGRAM(S): at 22:28

## 2024-10-24 VITALS
SYSTOLIC BLOOD PRESSURE: 118 MMHG | OXYGEN SATURATION: 99 % | RESPIRATION RATE: 18 BRPM | HEART RATE: 99 BPM | TEMPERATURE: 98 F | DIASTOLIC BLOOD PRESSURE: 72 MMHG

## 2024-10-24 LAB — HETEROPH AB TITR SER AGGL: NEGATIVE — SIGNIFICANT CHANGE UP

## 2024-10-24 RX ORDER — KETOROLAC TROMETHAMINE 30 MG/ML
15 INJECTION, SOLUTION INTRAMUSCULAR; INTRAVENOUS ONCE
Refills: 0 | Status: DISCONTINUED | OUTPATIENT
Start: 2024-10-24 | End: 2024-10-24

## 2024-10-24 RX ADMIN — KETOROLAC TROMETHAMINE 15 MILLIGRAM(S): 30 INJECTION, SOLUTION INTRAMUSCULAR; INTRAVENOUS at 00:21

## 2024-10-29 LAB
CULTURE RESULTS: SIGNIFICANT CHANGE UP
CULTURE RESULTS: SIGNIFICANT CHANGE UP
SPECIMEN SOURCE: SIGNIFICANT CHANGE UP
SPECIMEN SOURCE: SIGNIFICANT CHANGE UP

## 2024-12-27 ENCOUNTER — APPOINTMENT (OUTPATIENT)
Dept: OBGYN | Facility: CLINIC | Age: 34
End: 2024-12-27
Payer: COMMERCIAL

## 2024-12-27 VITALS — SYSTOLIC BLOOD PRESSURE: 114 MMHG | DIASTOLIC BLOOD PRESSURE: 76 MMHG | BODY MASS INDEX: 28.57 KG/M2 | WEIGHT: 177 LBS

## 2024-12-27 PROCEDURE — 99395 PREV VISIT EST AGE 18-39: CPT

## 2024-12-27 PROCEDURE — 99459 PELVIC EXAMINATION: CPT

## 2025-01-01 LAB
C TRACH RRNA SPEC QL NAA+PROBE: NOT DETECTED
HPV 16 E6+E7 MRNA CVX QL NAA+PROBE: NOT DETECTED
HPV HIGH+LOW RISK DNA PNL CVX: NOT DETECTED
HPV HIGH+LOW RISK DNA PNL CVX: NOT DETECTED
HPV18+45 E6+E7 MRNA CVX QL NAA+PROBE: NOT DETECTED
N GONORRHOEA RRNA SPEC QL NAA+PROBE: NOT DETECTED
SOURCE AMPLIFICATION: NORMAL

## 2025-01-02 LAB — CYTOLOGY CVX/VAG DOC THIN PREP: NORMAL

## 2025-03-18 NOTE — DISCHARGE NOTE OB - VISION (WITH CORRECTIVE LENSES IF THE PATIENT USUALLY WEARS THEM):
abnormal lab result
Normal vision: sees adequately in most situations; can see medication labels, newsprint